# Patient Record
Sex: MALE | Race: BLACK OR AFRICAN AMERICAN | NOT HISPANIC OR LATINO | Employment: FULL TIME | URBAN - METROPOLITAN AREA
[De-identification: names, ages, dates, MRNs, and addresses within clinical notes are randomized per-mention and may not be internally consistent; named-entity substitution may affect disease eponyms.]

---

## 2023-05-08 ENCOUNTER — OFFICE VISIT (OUTPATIENT)
Dept: PRIMARY CARE | Facility: CLINIC | Age: 68
End: 2023-05-08
Payer: MEDICARE

## 2023-05-08 VITALS
HEIGHT: 70 IN | SYSTOLIC BLOOD PRESSURE: 156 MMHG | WEIGHT: 181 LBS | HEART RATE: 71 BPM | DIASTOLIC BLOOD PRESSURE: 78 MMHG | BODY MASS INDEX: 25.91 KG/M2 | OXYGEN SATURATION: 100 %

## 2023-05-08 DIAGNOSIS — E78.2 MIXED HYPERLIPIDEMIA: ICD-10-CM

## 2023-05-08 DIAGNOSIS — I10 PRIMARY HYPERTENSION: ICD-10-CM

## 2023-05-08 DIAGNOSIS — J01.91 ACUTE RECURRENT SINUSITIS, UNSPECIFIED LOCATION: Primary | ICD-10-CM

## 2023-05-08 DIAGNOSIS — R41.3 MEMORY LOSS: ICD-10-CM

## 2023-05-08 PROCEDURE — 1036F TOBACCO NON-USER: CPT

## 2023-05-08 PROCEDURE — 1159F MED LIST DOCD IN RCRD: CPT

## 2023-05-08 PROCEDURE — 3078F DIAST BP <80 MM HG: CPT

## 2023-05-08 PROCEDURE — 99214 OFFICE O/P EST MOD 30 MIN: CPT

## 2023-05-08 PROCEDURE — 3077F SYST BP >= 140 MM HG: CPT

## 2023-05-08 RX ORDER — ATORVASTATIN CALCIUM 80 MG/1
80 TABLET, FILM COATED ORAL DAILY
Qty: 90 TABLET | Refills: 1 | Status: SHIPPED | OUTPATIENT
Start: 2023-05-08 | End: 2023-12-06

## 2023-05-08 RX ORDER — AMOXICILLIN AND CLAVULANATE POTASSIUM 875; 125 MG/1; MG/1
875 TABLET, FILM COATED ORAL 2 TIMES DAILY
Qty: 20 TABLET | Refills: 0 | Status: SHIPPED | OUTPATIENT
Start: 2023-05-08 | End: 2023-05-18

## 2023-05-08 RX ORDER — NAPROXEN SODIUM 220 MG/1
81 TABLET, FILM COATED ORAL DAILY
Qty: 30 TABLET | Refills: 5 | Status: SHIPPED | OUTPATIENT
Start: 2023-05-08 | End: 2023-11-04

## 2023-05-08 RX ORDER — LISINOPRIL 20 MG/1
20 TABLET ORAL DAILY
Qty: 30 TABLET | Refills: 0 | Status: SHIPPED | OUTPATIENT
Start: 2023-05-08 | End: 2023-09-12

## 2023-05-08 NOTE — PROGRESS NOTES
"Subjective   Patient ID: Johnnie Yuan is a 68 y.o. male who presents for Hypertension (Pt stated he was taken off of HTN meds, but still has a few at home that he take occasionally. Pt doesn't take cholesterol meds due to forgetting to take it.) and Sinusitis (Stopped up for 3 weeks, chest pain).    HPI    Sinusitis  Patient states he has had issues with sinus congestion off and on for the past two years  Endorsing significant clear nasal discharge, and congestion for the past three weeks which is worse than his baseline   Denies any fever, otalgia or generalized malaise  Patient experiencing chest tightness secondary to cough , which started a couple of days ago.  Also endorsing shortness of breath with walking multiple flights of stairs secondary to congestion.  When patient lays on his side he can feel sinuses drain and open  Has been taking Afrin for symptoms which has not been helping  Has not tested for COVID    2.  Hypertension  History of hypertension,   Was managed on Lisinopril 20mg every day  Has not taken his medication in months, other than one pill this morning    4. Hyperlipidemia  Prescribed Atorvastatin 80mg every day and ASA 81 mg every day  Has not taken medication secondary to forgetting   History of CVA        Review of Systems  All pertinent positive symptoms are included in the history of present illness.    All other systems have been reviewed and are negative and noncontributory to this patient's current ailments.     No Known Allergies       There is no immunization history on file for this patient.    Objective   Vitals:    05/08/23 1502   BP: 156/78   Pulse: 71   SpO2: 100%   Weight: 82.1 kg (181 lb)   Height: 1.778 m (5' 10\")       Physical Exam  CONSTITUTIONAL - well nourished, well developed, looks like stated age, in no acute distress, not ill-appearing, and not tired appearing  SKIN - normal skin color and pigmentation, normal skin turgor without rash, lesions, or nodules " visualized  HEAD - no trauma, normocephalic  EYES - pupils are equal and reactive to light, extraocular muscles are intact, and normal external exam  ENT - TM's intact, no injection, no signs of infection, uvula midline, normal tongue movement and throat normal, no exudate, nasal turbinates swollen  NECK - supple without rigidity, no neck mass was observed, no thyromegaly or thyroid nodules  CHEST - clear to auscultation, no wheezing, no crackles and no rales, good effort  CARDIAC - regular rate and regular rhythm, no skipped beats, no murmur  EXTREMITIES - no edema, no deformities  NEUROLOGICAL - normal gait, normal balance, normal motor, no ataxia,  alert, oriented and no focal signs  PSYCHIATRIC - alert, pleasant and cordial, age-appropriate  IMMUNOLOGIC - no cervical lymphadenopathy         Assessment/Plan   Problem List Items Addressed This Visit          Circulatory    Primary hypertension     Please return to care in 3-4 weeks for blood pressure check         Relevant Medications    lisinopril 20 mg tablet       Infectious/Inflammatory    Acute recurrent sinusitis - Primary     Will consider CXR if symptoms do not improve with antibiotics prescribed         Relevant Medications    amoxicillin-pot clavulanate (Augmentin) 875-125 mg tablet    Other Relevant Orders    Referral to ENT       Other    Mixed hyperlipidemia    Relevant Medications    aspirin 81 mg chewable tablet    atorvastatin (Lipitor) 80 mg tablet    Memory loss     Please return to care for MOCA screening at later time

## 2023-05-22 ENCOUNTER — OFFICE VISIT (OUTPATIENT)
Dept: PRIMARY CARE | Facility: CLINIC | Age: 68
End: 2023-05-22
Payer: MEDICARE

## 2023-05-22 VITALS
TEMPERATURE: 97.3 F | SYSTOLIC BLOOD PRESSURE: 116 MMHG | DIASTOLIC BLOOD PRESSURE: 64 MMHG | OXYGEN SATURATION: 98 % | BODY MASS INDEX: 26.32 KG/M2 | HEART RATE: 85 BPM | WEIGHT: 183.4 LBS

## 2023-05-22 DIAGNOSIS — J01.01 ACUTE RECURRENT MAXILLARY SINUSITIS: Primary | ICD-10-CM

## 2023-05-22 PROCEDURE — 99213 OFFICE O/P EST LOW 20 MIN: CPT

## 2023-05-22 PROCEDURE — 1036F TOBACCO NON-USER: CPT

## 2023-05-22 PROCEDURE — 3074F SYST BP LT 130 MM HG: CPT

## 2023-05-22 PROCEDURE — 3078F DIAST BP <80 MM HG: CPT

## 2023-05-22 PROCEDURE — 1159F MED LIST DOCD IN RCRD: CPT

## 2023-05-22 NOTE — ASSESSMENT & PLAN NOTE
ENT referral placed last visit  Please follow up with ENT for further work up following minimal response to antibiotics  Phone number to ENT scheduling given to patient directly  Please follow after you visit ENT

## 2023-05-22 NOTE — PROGRESS NOTES
Subjective   Patient ID: Johnnie Yuan is a 68 y.o. male who presents for Sinusitis (FUV/Stuffy; antibiotics didn't work. ).    HPI  Sinus congestion  Endorsing sinus congestion for the past four weeks  History of recurrent sinusitis, that comes and goes  Was prescribed Augmentin 05/10, states it did not improve improve symptoms   Denies any chest pain or shortness of breath  Last visit states that he felt chest congestion, this has since resolved and is isolated to maxillary sinuses  Denies any fevers       Review of Systems  All pertinent positive symptoms are included in the history of present illness.    All other systems have been reviewed and are negative and noncontributory to this patient's current ailments.     No Known Allergies       There is no immunization history on file for this patient.    Objective   Vitals:    05/22/23 0958   BP: 116/64   Pulse: 85   Temp: 36.3 °C (97.3 °F)   SpO2: 98%   Weight: 83.2 kg (183 lb 6.4 oz)       Physical Exam  CONSTITUTIONAL - well nourished, well developed, looks like stated age, in no acute distress, not ill-appearing, and not tired appearing  SKIN - normal skin color and pigmentation, normal skin turgor without rash, lesions, or nodules visualized  HEAD - no trauma, normocephalic  EYES - pupils are equal and reactive to light, extraocular muscles are intact, and normal external exam  ENT - TM's intact, no injection, no signs of infection, uvula midline, tongue movement, cobblestoning notable in the throat, no exudate, nasal passage with swollen nasal turbinates  NECK - supple without rigidity, no neck mass was observed, no thyromegaly or thyroid nodules  CHEST - clear to auscultation, no wheezing, no crackles and no rales, good effort  CARDIAC - regular rate and regular rhythm, no skipped beats, no murmur  EXTREMITIES - no edema, no deformities  NEUROLOGICAL - normal gait, normal balance, normal motor, no ataxia, alert, oriented and no focal signs  PSYCHIATRIC -  alert, pleasant and cordial, age-appropriate  IMMUNOLOGIC - no cervical lymphadenopathy         Assessment/Plan   Problem List Items Addressed This Visit          Infectious/Inflammatory    Acute recurrent sinusitis - Primary     ENT referral placed last visit  Please follow up with ENT for further work up following minimal response to antibiotics  Phone number to ENT scheduling given to patient directly  Please follow after you visit ENT

## 2023-08-22 ENCOUNTER — OFFICE VISIT (OUTPATIENT)
Dept: PRIMARY CARE | Facility: CLINIC | Age: 68
End: 2023-08-22
Payer: MEDICARE

## 2023-08-22 VITALS
OXYGEN SATURATION: 99 % | TEMPERATURE: 97.6 F | WEIGHT: 182 LBS | DIASTOLIC BLOOD PRESSURE: 74 MMHG | HEART RATE: 74 BPM | BODY MASS INDEX: 27.58 KG/M2 | HEIGHT: 68 IN | SYSTOLIC BLOOD PRESSURE: 134 MMHG

## 2023-08-22 DIAGNOSIS — D57.3 SICKLE-CELL TRAIT (CMS-HCC): ICD-10-CM

## 2023-08-22 DIAGNOSIS — Z87.891 FORMER CIGARETTE SMOKER: ICD-10-CM

## 2023-08-22 DIAGNOSIS — D72.819 LEUKOPENIA, UNSPECIFIED TYPE: Primary | ICD-10-CM

## 2023-08-22 DIAGNOSIS — R41.3 MEMORY LOSS: ICD-10-CM

## 2023-08-22 DIAGNOSIS — E55.9 VITAMIN D DEFICIENCY: ICD-10-CM

## 2023-08-22 DIAGNOSIS — I10 PRIMARY HYPERTENSION: ICD-10-CM

## 2023-08-22 DIAGNOSIS — J84.10 POSTINFLAMMATORY PULMONARY FIBROSIS (MULTI): ICD-10-CM

## 2023-08-22 DIAGNOSIS — E78.2 MIXED HYPERLIPIDEMIA: ICD-10-CM

## 2023-08-22 DIAGNOSIS — N52.9 ERECTILE DYSFUNCTION, UNSPECIFIED ERECTILE DYSFUNCTION TYPE: ICD-10-CM

## 2023-08-22 PROBLEM — H52.4 HYPEROPIA OF BOTH EYES WITH ASTIGMATISM AND PRESBYOPIA: Status: ACTIVE | Noted: 2023-08-22

## 2023-08-22 PROBLEM — R79.89 LOW VITAMIN D LEVEL: Status: ACTIVE | Noted: 2023-08-22

## 2023-08-22 PROBLEM — J34.3 HYPERTROPHY OF BOTH INFERIOR NASAL TURBINATES: Status: ACTIVE | Noted: 2023-08-22

## 2023-08-22 PROBLEM — H04.123 DRY EYE SYNDROME OF BOTH LACRIMAL GLANDS: Status: ACTIVE | Noted: 2023-08-22

## 2023-08-22 PROBLEM — Z86.19 HISTORY OF SHINGLES: Status: ACTIVE | Noted: 2023-08-22

## 2023-08-22 PROBLEM — J32.0 CHRONIC MAXILLARY SINUSITIS: Status: ACTIVE | Noted: 2023-08-22

## 2023-08-22 PROBLEM — Z86.73 HISTORY OF STROKE: Status: ACTIVE | Noted: 2023-08-22

## 2023-08-22 PROBLEM — H52.203 HYPEROPIA OF BOTH EYES WITH ASTIGMATISM AND PRESBYOPIA: Status: ACTIVE | Noted: 2023-08-22

## 2023-08-22 PROBLEM — H90.3 SENSORINEURAL HEARING LOSS, BILATERAL: Status: ACTIVE | Noted: 2023-08-22

## 2023-08-22 PROBLEM — J32.2 CHRONIC ETHMOIDAL SINUSITIS: Status: ACTIVE | Noted: 2023-08-22

## 2023-08-22 PROBLEM — H93.13 TINNITUS, SUBJECTIVE, BILATERAL: Status: ACTIVE | Noted: 2023-08-22

## 2023-08-22 PROBLEM — H25.13 CATARACT, NUCLEAR SCLEROTIC, BOTH EYES: Status: ACTIVE | Noted: 2023-08-22

## 2023-08-22 PROBLEM — H52.03 HYPEROPIA OF BOTH EYES WITH ASTIGMATISM AND PRESBYOPIA: Status: ACTIVE | Noted: 2023-08-22

## 2023-08-22 PROBLEM — M25.561 BILATERAL KNEE PAIN: Status: ACTIVE | Noted: 2023-08-22

## 2023-08-22 PROBLEM — R91.1 PULMONARY NODULE: Status: ACTIVE | Noted: 2023-08-22

## 2023-08-22 PROBLEM — M11.89 PSEUDOGOUT INVOLVING MULTIPLE JOINTS: Status: ACTIVE | Noted: 2023-08-22

## 2023-08-22 PROBLEM — K40.90 INGUINAL HERNIA: Status: ACTIVE | Noted: 2023-08-22

## 2023-08-22 PROBLEM — M25.562 BILATERAL KNEE PAIN: Status: ACTIVE | Noted: 2023-08-22

## 2023-08-22 PROCEDURE — 3078F DIAST BP <80 MM HG: CPT | Performed by: NURSE PRACTITIONER

## 2023-08-22 PROCEDURE — 99214 OFFICE O/P EST MOD 30 MIN: CPT | Performed by: NURSE PRACTITIONER

## 2023-08-22 PROCEDURE — 1160F RVW MEDS BY RX/DR IN RCRD: CPT | Performed by: NURSE PRACTITIONER

## 2023-08-22 PROCEDURE — 1036F TOBACCO NON-USER: CPT | Performed by: NURSE PRACTITIONER

## 2023-08-22 PROCEDURE — 3075F SYST BP GE 130 - 139MM HG: CPT | Performed by: NURSE PRACTITIONER

## 2023-08-22 PROCEDURE — 1125F AMNT PAIN NOTED PAIN PRSNT: CPT | Performed by: NURSE PRACTITIONER

## 2023-08-22 PROCEDURE — 1159F MED LIST DOCD IN RCRD: CPT | Performed by: NURSE PRACTITIONER

## 2023-08-22 RX ORDER — TAMSULOSIN HYDROCHLORIDE 0.4 MG/1
0.4 CAPSULE ORAL DAILY
COMMUNITY
Start: 2023-05-30 | End: 2024-02-02

## 2023-08-22 ASSESSMENT — ENCOUNTER SYMPTOMS
DEPRESSION: 0
OCCASIONAL FEELINGS OF UNSTEADINESS: 0
LOSS OF SENSATION IN FEET: 0

## 2023-08-22 NOTE — PROGRESS NOTES
"Subjective   Patient ID: Johnnie Yuan is a 68 y.o. male who presents for Injections (Pt seeks all injections needs before fall. /He requests inj for RSV and flu./I stated we don't offer covid inj.) and Erectile Dysfunction (Pt would like to discuss options for meds. ).    HPI     Dyslipidemia/hypertension  Current meds: atorvastatin 80 mg at bedtime (admits he forgets to take this at times), lisinopril 20 mg QD, ASA 81 mg QD  Home blood pressure monitoring: once daily   Average: 120s-130s/70s  Salt intake: adds a little bit of salt, does not consume a lot of fast food, take out, frozen foods  Caffeine intake: none  Diet: unhealthy   Exercise: none, active   Alcohol use: weekends, about 2 beers total   Tobacco use: none  Illicit drug use: marijuana once per week      Denies vision changes, headaches, dizziness, chest pain, palpitations, or edema.     BPH  Adherent to tamsulosin. Wakes up around 3 am to void. Does not drink caffeine. Denies urinary urgency, hematuria, dysuria, urinary frequency, or incontinence. He is experiencing ED and is wanting medications. States he has difficulty getting an erection and maintaining.      Postinflammatory pulmonary fibrosis   Has not smoked since 2015. Smoked for 40+ years, about 1 ppd. Denies coughing, chest pain, or shortness of breath.     Sickle Cell Trait  Denies SCD. Denies any symptoms.     Has been noticing issues with his memory since he had a stroke in 2015 but worse within the last year. States he barely remembers anything from his 20 years in the . He reports trouble remembering why he got up to do something or forgetting where he places things. States he saw a neurologist last year but cannot remember her name. States he was told everything was normal.     Review of Systems  ROS negative except as noted above in HPI.       Objective   /74   Pulse 74   Temp 36.4 °C (97.6 °F)   Ht 1.727 m (5' 8\")   Wt 82.6 kg (182 lb)   SpO2 99%   BMI 27.67 kg/m² "     Physical Exam  General: Alert and oriented, in no acute distress. Appears stated age, well-nourished, and well hydrated  HEENT:  - Head: Normocephalic and atraumatic   - Eyes: EOMI, PERRLA  - ENT: Hearing grossly intact  Heart: RRR. No murmurs, clicks, or rubs  Lungs: Unlabored breathing. CTAB with no crackles, wheezes, or rhonchi  Abdomen: Normal BS in all 4 quadrants. Soft, non-tender, non-distended, with no masses  Extremities: Warm and well perfused. No edema. Normal peripheral pulses  Musculoskeletal: Normal gait and station  Neurological: Alert and oriented. No gross neurological deficits  Psychological: Appropriate mood and affect  Skin: No rash, abnormal lesions, cyanosis, or erythema      Assessment/Plan   Hypertension  - Well controlled  - Continue lisinopril 20 mg every day  - Continue to monitor BP at home  - Lifestyle management    Dyslipidemia  - Check lipid panel, CMP  - Continue atorvastatin 80 mg every day    BPH  - Continue tamsulosin 0.4 mg every day   - Recommend following up with urology as instructed    ED  - Difficulty getting and maintaining an erection  - Recommend following up with urology    Postinflammatory pulmonary fibrosis   - Denies coughing, chest pain, shortness of breath    Former smoker  - CT lung CA screening    Sickle cell trait  - Asymptomatic    Memory loss  - MOCA score: 21/30  - Neuropsychology referral    Leukopenia  - Check CBCd  - Will likely need to follow up with hematology    Vitamin D deficiency  - Check vit D     RTC in 3 months for Medicare Wellness Exam or sooner IFEOMA Solorio-CNP  [facility]

## 2023-08-23 ENCOUNTER — LAB (OUTPATIENT)
Dept: LAB | Facility: LAB | Age: 68
End: 2023-08-23
Payer: MEDICARE

## 2023-08-23 DIAGNOSIS — D72.819 LEUKOPENIA, UNSPECIFIED TYPE: ICD-10-CM

## 2023-08-23 DIAGNOSIS — E78.2 MIXED HYPERLIPIDEMIA: ICD-10-CM

## 2023-08-23 DIAGNOSIS — I10 PRIMARY HYPERTENSION: ICD-10-CM

## 2023-08-23 DIAGNOSIS — E55.9 VITAMIN D DEFICIENCY: ICD-10-CM

## 2023-08-23 LAB
ALANINE AMINOTRANSFERASE (SGPT) (U/L) IN SER/PLAS: 19 U/L (ref 10–52)
ALBUMIN (G/DL) IN SER/PLAS: 4.4 G/DL (ref 3.4–5)
ALKALINE PHOSPHATASE (U/L) IN SER/PLAS: 80 U/L (ref 33–136)
ANION GAP IN SER/PLAS: 8 MMOL/L (ref 10–20)
ASPARTATE AMINOTRANSFERASE (SGOT) (U/L) IN SER/PLAS: 16 U/L (ref 9–39)
BASOPHILS (10*3/UL) IN BLOOD BY AUTOMATED COUNT: 0.04 X10E9/L (ref 0–0.1)
BASOPHILS/100 LEUKOCYTES IN BLOOD BY AUTOMATED COUNT: 1.2 % (ref 0–2)
BILIRUBIN TOTAL (MG/DL) IN SER/PLAS: 0.7 MG/DL (ref 0–1.2)
CALCIDIOL (25 OH VITAMIN D3) (NG/ML) IN SER/PLAS: 37 NG/ML
CALCIUM (MG/DL) IN SER/PLAS: 9.6 MG/DL (ref 8.6–10.6)
CARBON DIOXIDE, TOTAL (MMOL/L) IN SER/PLAS: 31 MMOL/L (ref 21–32)
CHLORIDE (MMOL/L) IN SER/PLAS: 106 MMOL/L (ref 98–107)
CHOLESTEROL (MG/DL) IN SER/PLAS: 220 MG/DL (ref 0–199)
CHOLESTEROL IN HDL (MG/DL) IN SER/PLAS: 48.2 MG/DL
CHOLESTEROL/HDL RATIO: 4.6
CREATININE (MG/DL) IN SER/PLAS: 1.12 MG/DL (ref 0.5–1.3)
EOSINOPHILS (10*3/UL) IN BLOOD BY AUTOMATED COUNT: 0.11 X10E9/L (ref 0–0.7)
EOSINOPHILS/100 LEUKOCYTES IN BLOOD BY AUTOMATED COUNT: 3.3 % (ref 0–6)
ERYTHROCYTE DISTRIBUTION WIDTH (RATIO) BY AUTOMATED COUNT: 12.1 % (ref 11.5–14.5)
ERYTHROCYTE MEAN CORPUSCULAR HEMOGLOBIN CONCENTRATION (G/DL) BY AUTOMATED: 32.8 G/DL (ref 32–36)
ERYTHROCYTE MEAN CORPUSCULAR VOLUME (FL) BY AUTOMATED COUNT: 96 FL (ref 80–100)
ERYTHROCYTES (10*6/UL) IN BLOOD BY AUTOMATED COUNT: 4.57 X10E12/L (ref 4.5–5.9)
GFR MALE: 71 ML/MIN/1.73M2
GLUCOSE (MG/DL) IN SER/PLAS: 98 MG/DL (ref 74–99)
HEMATOCRIT (%) IN BLOOD BY AUTOMATED COUNT: 43.9 % (ref 41–52)
HEMOGLOBIN (G/DL) IN BLOOD: 14.4 G/DL (ref 13.5–17.5)
IMMATURE GRANULOCYTES/100 LEUKOCYTES IN BLOOD BY AUTOMATED COUNT: 0.3 % (ref 0–0.9)
LDL: 154 MG/DL (ref 0–99)
LEUKOCYTES (10*3/UL) IN BLOOD BY AUTOMATED COUNT: 3.4 X10E9/L (ref 4.4–11.3)
LYMPHOCYTES (10*3/UL) IN BLOOD BY AUTOMATED COUNT: 1.65 X10E9/L (ref 1.2–4.8)
LYMPHOCYTES/100 LEUKOCYTES IN BLOOD BY AUTOMATED COUNT: 48.8 % (ref 13–44)
MONOCYTES (10*3/UL) IN BLOOD BY AUTOMATED COUNT: 0.37 X10E9/L (ref 0.1–1)
MONOCYTES/100 LEUKOCYTES IN BLOOD BY AUTOMATED COUNT: 10.9 % (ref 2–10)
NEUTROPHILS (10*3/UL) IN BLOOD BY AUTOMATED COUNT: 1.2 X10E9/L (ref 1.2–7.7)
NEUTROPHILS/100 LEUKOCYTES IN BLOOD BY AUTOMATED COUNT: 35.5 % (ref 40–80)
NRBC (PER 100 WBCS) BY AUTOMATED COUNT: 0 /100 WBC (ref 0–0)
PLATELETS (10*3/UL) IN BLOOD AUTOMATED COUNT: 240 X10E9/L (ref 150–450)
POTASSIUM (MMOL/L) IN SER/PLAS: 4.4 MMOL/L (ref 3.5–5.3)
PROTEIN TOTAL: 7.3 G/DL (ref 6.4–8.2)
SODIUM (MMOL/L) IN SER/PLAS: 141 MMOL/L (ref 136–145)
TRIGLYCERIDE (MG/DL) IN SER/PLAS: 91 MG/DL (ref 0–149)
UREA NITROGEN (MG/DL) IN SER/PLAS: 11 MG/DL (ref 6–23)
VLDL: 18 MG/DL (ref 0–40)

## 2023-08-23 PROCEDURE — 85025 COMPLETE CBC W/AUTO DIFF WBC: CPT

## 2023-08-23 PROCEDURE — 82306 VITAMIN D 25 HYDROXY: CPT

## 2023-08-23 PROCEDURE — 80061 LIPID PANEL: CPT

## 2023-08-23 PROCEDURE — 36415 COLL VENOUS BLD VENIPUNCTURE: CPT

## 2023-08-23 PROCEDURE — 80053 COMPREHEN METABOLIC PANEL: CPT

## 2023-08-24 ENCOUNTER — TELEPHONE (OUTPATIENT)
Dept: PRIMARY CARE | Facility: CLINIC | Age: 68
End: 2023-08-24
Payer: MEDICARE

## 2023-08-24 DIAGNOSIS — D72.819 LEUKOPENIA, UNSPECIFIED TYPE: Primary | ICD-10-CM

## 2023-08-24 NOTE — RESULT ENCOUNTER NOTE
Please inform patient that his white blood cell count was still low. Recommend that he follow up with hematology in December as recommended last year. Does not look like the hematologist he saw last year is with  anymore, so I placed a new order for hematology. He can call [phone] to schedule.     Cholesterol elevated. Remind him to take his atorvastatin every day.

## 2023-09-11 DIAGNOSIS — I10 PRIMARY HYPERTENSION: ICD-10-CM

## 2023-09-12 RX ORDER — LISINOPRIL 20 MG/1
20 TABLET ORAL DAILY
Qty: 90 TABLET | Refills: 1 | Status: SHIPPED | OUTPATIENT
Start: 2023-09-12 | End: 2024-05-10

## 2023-11-10 PROBLEM — L91.8 OTHER HYPERTROPHIC DISORDERS OF THE SKIN: Status: ACTIVE | Noted: 2022-02-24

## 2023-11-10 PROBLEM — M17.12 ARTHRITIS OF KNEE, LEFT: Status: ACTIVE | Noted: 2023-11-10

## 2023-11-10 PROBLEM — L82.0 INFLAMED SEBORRHEIC KERATOSIS: Status: ACTIVE | Noted: 2022-02-24

## 2023-11-10 PROBLEM — L81.0 POSTINFLAMMATORY HYPERPIGMENTATION: Status: ACTIVE | Noted: 2022-02-24

## 2023-11-10 PROBLEM — M25.562 LEFT KNEE PAIN: Status: ACTIVE | Noted: 2023-11-10

## 2023-11-10 PROBLEM — D23.9 OTHER BENIGN NEOPLASM OF SKIN, UNSPECIFIED: Status: ACTIVE | Noted: 2022-02-24

## 2023-11-28 ENCOUNTER — APPOINTMENT (OUTPATIENT)
Dept: PRIMARY CARE | Facility: CLINIC | Age: 68
End: 2023-11-28
Payer: MEDICARE

## 2023-12-06 DIAGNOSIS — E78.2 MIXED HYPERLIPIDEMIA: ICD-10-CM

## 2023-12-06 RX ORDER — ATORVASTATIN CALCIUM 80 MG/1
80 TABLET, FILM COATED ORAL DAILY
Qty: 90 TABLET | Refills: 0 | Status: SHIPPED | OUTPATIENT
Start: 2023-12-06 | End: 2024-03-07 | Stop reason: SINTOL

## 2023-12-19 ENCOUNTER — OFFICE VISIT (OUTPATIENT)
Dept: PSYCHOLOGY | Facility: CLINIC | Age: 68
End: 2023-12-19
Payer: MEDICARE

## 2023-12-19 DIAGNOSIS — R41.3 MEMORY DEFICITS: Primary | ICD-10-CM

## 2023-12-19 DIAGNOSIS — R41.89 COGNITIVE DEFICITS: ICD-10-CM

## 2023-12-19 DIAGNOSIS — G31.84 MCI (MILD COGNITIVE IMPAIRMENT): ICD-10-CM

## 2023-12-19 PROCEDURE — 96116 NUBHVL XM PHYS/QHP 1ST HR: CPT | Mod: AH | Performed by: CLINICAL NEUROPSYCHOLOGIST

## 2023-12-19 PROCEDURE — 96133 NRPSYC TST EVAL PHYS/QHP EA: CPT | Mod: AH | Performed by: CLINICAL NEUROPSYCHOLOGIST

## 2023-12-19 PROCEDURE — 96133 NRPSYC TST EVAL PHYS/QHP EA: CPT | Performed by: CLINICAL NEUROPSYCHOLOGIST

## 2023-12-19 PROCEDURE — 96138 PSYCL/NRPSYC TECH 1ST: CPT | Mod: AH | Performed by: CLINICAL NEUROPSYCHOLOGIST

## 2023-12-19 PROCEDURE — 96132 NRPSYC TST EVAL PHYS/QHP 1ST: CPT | Performed by: CLINICAL NEUROPSYCHOLOGIST

## 2023-12-19 PROCEDURE — 1036F TOBACCO NON-USER: CPT | Performed by: CLINICAL NEUROPSYCHOLOGIST

## 2023-12-19 PROCEDURE — 96132 NRPSYC TST EVAL PHYS/QHP 1ST: CPT | Mod: AH | Performed by: CLINICAL NEUROPSYCHOLOGIST

## 2023-12-19 PROCEDURE — 1160F RVW MEDS BY RX/DR IN RCRD: CPT | Performed by: CLINICAL NEUROPSYCHOLOGIST

## 2023-12-19 PROCEDURE — 96139 PSYCL/NRPSYC TST TECH EA: CPT | Mod: AH | Performed by: CLINICAL NEUROPSYCHOLOGIST

## 2023-12-19 PROCEDURE — 96138 PSYCL/NRPSYC TECH 1ST: CPT | Performed by: CLINICAL NEUROPSYCHOLOGIST

## 2023-12-19 PROCEDURE — 96116 NUBHVL XM PHYS/QHP 1ST HR: CPT | Performed by: CLINICAL NEUROPSYCHOLOGIST

## 2023-12-19 PROCEDURE — 96139 PSYCL/NRPSYC TST TECH EA: CPT | Performed by: CLINICAL NEUROPSYCHOLOGIST

## 2023-12-19 PROCEDURE — 1125F AMNT PAIN NOTED PAIN PRSNT: CPT | Performed by: CLINICAL NEUROPSYCHOLOGIST

## 2023-12-19 PROCEDURE — 1159F MED LIST DOCD IN RCRD: CPT | Performed by: CLINICAL NEUROPSYCHOLOGIST
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
## 2024-01-03 ENCOUNTER — APPOINTMENT (OUTPATIENT)
Dept: PRIMARY CARE | Facility: CLINIC | Age: 69
End: 2024-01-03
Payer: OTHER GOVERNMENT

## 2024-01-09 ENCOUNTER — DOCUMENTATION (OUTPATIENT)
Dept: HEMATOLOGY/ONCOLOGY | Facility: HOSPITAL | Age: 69
End: 2024-01-09
Payer: MEDICARE

## 2024-01-09 NOTE — PROGRESS NOTES
Diagnosis leukopenia(2014 neutropenia, lymphocytopenia). History includes-leukopenia, mild cognitive impairment, memory loss, prior stroke (2015), anxiety, HTN, HLD, BPH, sickle cell trait, post inflammatory pulmonary fibrosis, recurrent sinusitis, Vitamin D deficiency.  Former patient of Dr. Del Cid, last visit 1/22/22. Previously saw Dr. Bingham.  Last labs 8/23/23 WBC 3.4, RBC 4.57, HGB 14.4, Hematocrit 43.9,   Patient was left a message with details for appointment scheduled on 1/18/24. Call [phone] to cancel or reschedule this appointment.

## 2024-01-16 ENCOUNTER — LAB (OUTPATIENT)
Dept: LAB | Facility: CLINIC | Age: 69
End: 2024-01-16
Payer: MEDICARE

## 2024-01-16 DIAGNOSIS — D72.819 LEUKOPENIA, UNSPECIFIED TYPE: ICD-10-CM

## 2024-01-16 DIAGNOSIS — D72.819 LEUKOPENIA, UNSPECIFIED TYPE: Primary | ICD-10-CM

## 2024-01-16 LAB
BASOPHILS # BLD AUTO: 0.02 X10*3/UL (ref 0–0.1)
BASOPHILS NFR BLD AUTO: 0.6 %
CRP SERPL-MCNC: <0.1 MG/DL
EOSINOPHIL # BLD AUTO: 0.1 X10*3/UL (ref 0–0.7)
EOSINOPHIL NFR BLD AUTO: 2.8 %
ERYTHROCYTE [DISTWIDTH] IN BLOOD BY AUTOMATED COUNT: 11.7 % (ref 11.5–14.5)
ERYTHROCYTE [SEDIMENTATION RATE] IN BLOOD BY WESTERGREN METHOD: 7 MM/H (ref 0–20)
HCT VFR BLD AUTO: 40.7 % (ref 41–52)
HGB BLD-MCNC: 14 G/DL (ref 13.5–17.5)
IMM GRANULOCYTES # BLD AUTO: 0.01 X10*3/UL (ref 0–0.7)
IMM GRANULOCYTES NFR BLD AUTO: 0.3 % (ref 0–0.9)
LYMPHOCYTES # BLD AUTO: 1.42 X10*3/UL (ref 1.2–4.8)
LYMPHOCYTES NFR BLD AUTO: 39.8 %
MCH RBC QN AUTO: 31.7 PG (ref 26–34)
MCHC RBC AUTO-ENTMCNC: 34.4 G/DL (ref 32–36)
MCV RBC AUTO: 92 FL (ref 80–100)
MONOCYTES # BLD AUTO: 0.33 X10*3/UL (ref 0.1–1)
MONOCYTES NFR BLD AUTO: 9.2 %
NEUTROPHILS # BLD AUTO: 1.69 X10*3/UL (ref 1.2–7.7)
NEUTROPHILS NFR BLD AUTO: 47.3 %
NRBC BLD-RTO: ABNORMAL /100{WBCS}
PLATELET # BLD AUTO: 234 X10*3/UL (ref 150–450)
RBC # BLD AUTO: 4.42 X10*6/UL (ref 4.5–5.9)
RHEUMATOID FACT SER NEPH-ACNC: <10 IU/ML (ref 0–15)
WBC # BLD AUTO: 3.6 X10*3/UL (ref 4.4–11.3)

## 2024-01-16 PROCEDURE — 86140 C-REACTIVE PROTEIN: CPT | Performed by: PHYSICIAN ASSISTANT

## 2024-01-16 PROCEDURE — 85652 RBC SED RATE AUTOMATED: CPT | Performed by: PHYSICIAN ASSISTANT

## 2024-01-16 PROCEDURE — 88189 FLOWCYTOMETRY/READ 16 & >: CPT | Performed by: PHYSICIAN ASSISTANT

## 2024-01-16 PROCEDURE — 86431 RHEUMATOID FACTOR QUANT: CPT | Performed by: PHYSICIAN ASSISTANT

## 2024-01-16 PROCEDURE — 85025 COMPLETE CBC W/AUTO DIFF WBC: CPT

## 2024-01-16 PROCEDURE — 36415 COLL VENOUS BLD VENIPUNCTURE: CPT

## 2024-01-16 PROCEDURE — 88185 FLOWCYTOMETRY/TC ADD-ON: CPT | Mod: TC | Performed by: PHYSICIAN ASSISTANT

## 2024-01-18 ENCOUNTER — OFFICE VISIT (OUTPATIENT)
Dept: HEMATOLOGY/ONCOLOGY | Facility: CLINIC | Age: 69
End: 2024-01-18
Payer: MEDICARE

## 2024-01-18 VITALS
RESPIRATION RATE: 18 BRPM | BODY MASS INDEX: 27.42 KG/M2 | DIASTOLIC BLOOD PRESSURE: 77 MMHG | WEIGHT: 180.34 LBS | SYSTOLIC BLOOD PRESSURE: 131 MMHG | TEMPERATURE: 98.6 F | OXYGEN SATURATION: 94 % | HEART RATE: 76 BPM

## 2024-01-18 DIAGNOSIS — D72.810 LYMPHOPENIA: Primary | ICD-10-CM

## 2024-01-18 PROCEDURE — 99215 OFFICE O/P EST HI 40 MIN: CPT | Performed by: PHYSICIAN ASSISTANT

## 2024-01-18 PROCEDURE — 1036F TOBACCO NON-USER: CPT | Performed by: PHYSICIAN ASSISTANT

## 2024-01-18 PROCEDURE — 99205 OFFICE O/P NEW HI 60 MIN: CPT | Performed by: PHYSICIAN ASSISTANT

## 2024-01-18 PROCEDURE — 1160F RVW MEDS BY RX/DR IN RCRD: CPT | Performed by: PHYSICIAN ASSISTANT

## 2024-01-18 PROCEDURE — 3075F SYST BP GE 130 - 139MM HG: CPT | Performed by: PHYSICIAN ASSISTANT

## 2024-01-18 PROCEDURE — 1159F MED LIST DOCD IN RCRD: CPT | Performed by: PHYSICIAN ASSISTANT

## 2024-01-18 PROCEDURE — 1126F AMNT PAIN NOTED NONE PRSNT: CPT | Performed by: PHYSICIAN ASSISTANT

## 2024-01-18 PROCEDURE — 3078F DIAST BP <80 MM HG: CPT | Performed by: PHYSICIAN ASSISTANT

## 2024-01-18 ASSESSMENT — ENCOUNTER SYMPTOMS
OCCASIONAL FEELINGS OF UNSTEADINESS: 0
DEPRESSION: 0
LOSS OF SENSATION IN FEET: 0

## 2024-01-18 ASSESSMENT — PATIENT HEALTH QUESTIONNAIRE - PHQ9
SUM OF ALL RESPONSES TO PHQ9 QUESTIONS 1 AND 2: 0
1. LITTLE INTEREST OR PLEASURE IN DOING THINGS: NOT AT ALL
2. FEELING DOWN, DEPRESSED OR HOPELESS: NOT AT ALL

## 2024-01-18 ASSESSMENT — COLUMBIA-SUICIDE SEVERITY RATING SCALE - C-SSRS
1. IN THE PAST MONTH, HAVE YOU WISHED YOU WERE DEAD OR WISHED YOU COULD GO TO SLEEP AND NOT WAKE UP?: NO
2. HAVE YOU ACTUALLY HAD ANY THOUGHTS OF KILLING YOURSELF?: NO
6. HAVE YOU EVER DONE ANYTHING, STARTED TO DO ANYTHING, OR PREPARED TO DO ANYTHING TO END YOUR LIFE?: NO

## 2024-01-18 ASSESSMENT — PAIN SCALES - GENERAL: PAINLEVEL: 0-NO PAIN

## 2024-01-18 NOTE — PROGRESS NOTES
Patient ID: Johnnie Yuan is a 68 y.o. male.  Referring Physician: No referring provider defined for this encounter.  Primary Care Provider: JASPAL Catalan  Visit Type: Follow Up    Location: [facility]  Diagnosis/Reason: Chronic Benign Leukopenia    Interval Hx  1/18/24  Johnnie Yuan is a 68 y.o. male following up today for transfer of care from Dr. Guicho Del Cid to R. Casal, DNP    Patient is primarily followed by my colleague R. Casal, DNP but is following up w/ me today as I am covering    NOTE:  Patient was previously followed by Dr. Del Cid in heme/onc and is transferring care to R. Casal, DNP (seeing RAMIN Jasmine PA-C who is covering). His last visit w/ Dr. Del Cid was 12/22/22 for leukopenia. At that time Dr. Del Cid attributed his neutropenia & lymphopenia to be idiopathic/chronic benign leukopenia and recommended annual follow-ups    Patient denies weight loss, abnormal bruising and bleeding, hematuria, blood in stool, dark/black stools, epistaxis, oral/gingival bleeding, lymphadenopathy, recurrent infections, recurrent fevers, night sweats, early satiety, abdominal pain, bone pain, chest pain, palpitations, SOB, MULLEN, fatigue, dizziness, lightheadedness, PICA.    Relevant Hx  12/22/22 - Last Visit w/ Dr. Del Cid  Patient had previously seen Dr. Bingham in 2018 for the same problem.  Work up included Hep B/C, HIV, arthritis panel, antiphospholipid antibodies, MGUS evaluation.  All of this was negative.  A US of liver and spleen was ordered but apparently not done  in our system.     The patient reports that he has been having more sinus problems recently but he does not have a history of recurrent infections.  Describes these more as allergies.  No recent hospitalizations.  He also denies fevers, night sweats, and weight loss. He  notes that he has been having issues with his leg where he feels as though his blood has thickened. He has not had any difficulty walking due to this issue, but  it does primarily affect him when he first wakes up in the morning. He adds that he has had  longstanding issues with his leg since being in the . He adds that he has been eating a normal diet and he does drink alcohol on the weekends, with about 6 beers and 2 alcoholic drinks.     Repeat labs sent 8/2022 with mild lymphocytopenia (0.96), mild neutropenia (1.44), mild anemia HGB 13.2.  Flow cytometry was normal.  CMP was normal, ferritin, folate were normal.  TSH, TMA was normal.  U    PMHx:  Active Ambulatory Problems     Diagnosis Date Noted    Acute recurrent sinusitis 05/08/2023    Mixed hyperlipidemia 05/08/2023    Primary hypertension 05/08/2023    Memory changes 05/08/2023    Bilateral knee pain 08/22/2023    Chronic ethmoidal sinusitis 08/22/2023    Cataract, nuclear sclerotic, both eyes 08/22/2023    Chronic maxillary sinusitis 08/22/2023    Dry eye syndrome of both lacrimal glands 08/22/2023    History of shingles 08/22/2023    History of stroke 08/22/2023    Hyperopia of both eyes with astigmatism and presbyopia 08/22/2023    Hypertrophy of both inferior nasal turbinates 08/22/2023    Inguinal hernia 08/22/2023    Leukocytopenia 08/22/2023    Low vitamin D level 08/22/2023    Pseudogout involving multiple joints 08/22/2023    Pulmonary nodule 08/22/2023    Sensorineural hearing loss, bilateral 08/22/2023    Tinnitus, subjective, bilateral 08/22/2023    Vitamin D deficiency 08/22/2023    Postinflammatory pulmonary fibrosis (CMS/HCC) 08/22/2023    Sickle-cell trait (CMS/HCC) 08/22/2023    Arthritis of knee, left 11/10/2023    Other benign neoplasm of skin, unspecified 02/24/2022    Other hypertrophic disorders of the skin 02/24/2022    Inflamed seborrheic keratosis 02/24/2022    Postinflammatory hyperpigmentation 02/24/2022    Left knee pain 11/10/2023     Resolved Ambulatory Problems     Diagnosis Date Noted    No Resolved Ambulatory Problems     Past Medical History:   Diagnosis Date    Acute  upper respiratory infection, unspecified 2019    Encounter for screening for malignant neoplasm of colon 2018    Other conditions influencing health status 2019    Other general symptoms and signs     Personal history of nicotine dependence 2018    Personal history of other diseases of the respiratory system 2019    Personal history of other diseases of the respiratory system     Personal history of other diseases of urinary system     Personal history of other specified conditions 2019    Personal history of other specified conditions 2019    Personal history of other specified conditions 2014    Personal history of transient ischemic attack (TIA), and cerebral infarction without residual deficits 2017      Stroke in   HTN  DLD    PSHx:  Past Surgical History:   Procedure Laterality Date    CT ANGIO NECK W  2014    CT NECK ANGIO W AND WO IV CONTRAST 2014 [facility] CLINICAL LEGACY    CT HEAD ANGIO W AND WO IV CONTRAST  2014    CT HEAD ANGIO W AND WO IV CONTRAST 2014 [facility] CLINICAL LEGACY    MR HEAD ANGIO WO IV CONTRAST  9/3/2014    MR HEAD ANGIO WO IV CONTRAST 9/3/2014 [facility] ANCILLARY LEGACY    MR NECK ANGIO WO IV CONTRAST  9/3/2014    MR NECK ANGIO WO IV CONTRAST 9/3/2014 [facility] ANCILLARY LEGACY    OTHER SURGICAL HISTORY  2017    Treatment Of The Left Leg    OTHER SURGICAL HISTORY  2019    Hernia repair      Knee surgery  Leg fracture    FHx:  Family History   Problem Relation Name Age of Onset    Other (end stage renal disease) Mother      Cancer Mother      Diabetes Father      Other (cardiac disorder) Other      Sickle cell trait Other        Mother: Pancreatic CA  Siblings: Brother -  w/ Sickle Cell Disease, Arrhythmia    Social Hx:  Johnnie Yuan    reports that he has never smoked. He has never used smokeless tobacco.  He  reports current alcohol use.  He  reports current drug use. Drug: Marijuana.  Social History      Socioeconomic History    Marital status:      Spouse name: Not on file    Number of children: Not on file    Years of education: Not on file    Highest education level: Not on file   Occupational History    Not on file   Tobacco Use    Smoking status: Never    Smokeless tobacco: Never   Substance and Sexual Activity    Alcohol use: Yes     Comment: beers on weekends    Drug use: Yes     Types: Marijuana    Sexual activity: Not on file   Other Topics Concern    Not on file   Social History Narrative    Not on file     Social Determinants of Health     Financial Resource Strain: Not on file   Food Insecurity: Not on file   Transportation Needs: Not on file   Physical Activity: Not on file   Stress: Not on file   Social Connections: Not on file   Intimate Partner Violence: Not on file   Housing Stability: Not on file      Living Situation: Lives at home  Occupation: Former  x 20 years  Alcohol Use: Heavy EtOH use on weekends (8 drinks on Saturdays & Sundays)  Smoking: Former smoker - Quit in 2015 - 20 pack year smoking hx  Recreational Drug Use: Denies  Special Diets: Reguar    Medications and allergies reviewed in EMR.    ROS:  Review of Systems - Oncology   10 point review of systems negative except as state in HPI.    Vitals & Statistics:  Objective   BSA: There is no height or weight on file to calculate BSA.  There were no vitals taken for this visit.    Physical Exam:  Physical Exam  Vitals and nursing note reviewed.   Constitutional:       Appearance: Normal appearance.   HENT:      Head: Normocephalic and atraumatic.      Right Ear: External ear normal.      Left Ear: External ear normal.      Nose: Nose normal.      Mouth/Throat:      Mouth: Mucous membranes are moist.      Pharynx: Oropharynx is clear.   Eyes:      General: Lids are normal.      Extraocular Movements: Extraocular movements intact.      Conjunctiva/sclera: Conjunctivae normal.      Pupils: Pupils are equal, round, and  reactive to light.   Cardiovascular:      Rate and Rhythm: Normal rate and regular rhythm.      Pulses: Normal pulses.      Heart sounds: Normal heart sounds.   Pulmonary:      Effort: Pulmonary effort is normal.      Breath sounds: Normal breath sounds.   Abdominal:      General: Abdomen is flat. Bowel sounds are normal.      Palpations: Abdomen is soft.      Comments: No masses or organomegaly upon physical exam   Musculoskeletal:         General: Normal range of motion.      Cervical back: Normal range of motion.   Lymphadenopathy:      Comments: No lymphadenopathy palpable on physical exam   Skin:     General: Skin is warm and dry.      Capillary Refill: Capillary refill takes less than 2 seconds.   Neurological:      General: No focal deficit present.      Mental Status: He is alert and oriented to person, place, and time.   Psychiatric:         Mood and Affect: Mood normal.         Behavior: Behavior normal.         Thought Content: Thought content normal.         Judgment: Judgment normal.           Results:  Lab Results   Component Value Date    WBC 3.6 (L) 01/16/2024    NEUTROABS 1.69 01/16/2024    IGABSOL 0.01 01/16/2024    LYMPHSABS 1.42 01/16/2024    MONOSABS 0.33 01/16/2024    EOSABS 0.10 01/16/2024    BASOSABS 0.02 01/16/2024    RBC 4.42 (L) 01/16/2024    MCV 92 01/16/2024    MCHC 34.4 01/16/2024    HGB 14.0 01/16/2024    HCT 40.7 (L) 01/16/2024     01/16/2024     Lab Results   Component Value Date    RETICCTPCT 1.7 12/17/2018      Lab Results   Component Value Date    CREATININE 1.12 08/23/2023    BUN 11 08/23/2023     08/23/2023    K 4.4 08/23/2023     08/23/2023    CO2 31 08/23/2023      Lab Results   Component Value Date    ALT 19 08/23/2023    AST 16 08/23/2023    ALKPHOS 80 08/23/2023    BILITOT 0.7 08/23/2023      Lab Results   Component Value Date    TSH 1.84 08/29/2022     Lab Results   Component Value Date    TSH 1.84 08/29/2022     Lab Results   Component Value Date     "FERRITIN 364 (H) 08/29/2022      Lab Results   Component Value Date    HLMUMIVQ00 531 10/20/2017      Lab Results   Component Value Date    FOLATE 11.1 08/29/2022     Lab Results   Component Value Date    SIMON NEGATIVE 12/17/2018    RF <10 01/16/2024    SEDRATE 7 01/16/2024      Lab Results   Component Value Date    CRP <0.10 01/16/2024      No results found for: \"MYKE\"  Lab Results   Component Value Date     08/29/2022     No results found for: \"HAPTOGLOBIN\"  Lab Results   Component Value Date    SPEP NORMAL 12/17/2018     Lab Results   Component Value Date    IGG 1,480 12/17/2018    IGM 34 (L) 12/17/2018     (H) 12/17/2018     Lab Results   Component Value Date    HEPBCAB NONREACTIVE 12/17/2018    HEPBSAG NONREACTIVE 12/17/2018    HEPCAB NON-REACTIVE 12/17/2018     Lab Results   Component Value Date    HIV1X2 NON REACTIVE 12/17/2018       Assessment:  Johnnie Yuan is a 68 y.o. male presenting today for transfer of care from Dr. Del Cid to R. Casal, DNP for chronic benign leukopenia    Patient is asymptomatic     Physical exam     I reviewed patient's chart including but not limited to labs, imaging, surgical/procedure notes, pathology, hospital notes, doctor's notes.    1/18/24 results:  Leukopenia at 3.6 - Differential WNL  MCV, MCHC, Hb, RDW all WNL - Hct & RBC modestly low  Platelets WNL    Plan:  Chronic Benign Leukopenia  Patient is stable and asymptomatic. No need for hematologic intervention at this time  RTC in 1  year  via in-person visit - F/U sooner if needed/urgent  W/ R. Casal, DNP w/ CBC-D, ESR, CRP up to 1 week prior - Orders entered    I had an extensive discussion with the patient regarding the diagnosis and discussed the plan of therapy, including general considerations regarding side effects and outcomes. Pt understood and gave appropriate teach back about the plan of care. All questions were answered to the patient's satisfaction. The patient is instructed to contact us at any time if " questions or problems arise. Thank you for the opportunity to participate in the care of this very pleasant patient.    Total time = 80 minutes. 50% or more of this time was spent in counseling and/or coordination of care including reviewing medical history/radiology/labs, examining patient, formulating outlined plan with team, and discussing plan with patient/family.      Sergio Jasmine PA-C

## 2024-01-19 LAB
CELL COUNT (BLOOD): 3.6 X10*3/UL
CELL POPULATIONS: NORMAL
DIAGNOSIS: NORMAL
FLOW DIFFERENTIAL: NORMAL
FLOW TEST ORDERED: NORMAL
LAB TEST METHOD: NORMAL
NUMBER OF CELLS COLLECTED: NORMAL PER TUBE
PATH REPORT.TOTAL CANCER: NORMAL
SIGNATURE COMMENT: NORMAL
SPECIMEN VIABILITY: NORMAL

## 2024-02-02 DIAGNOSIS — N40.0 BENIGN PROSTATIC HYPERPLASIA, UNSPECIFIED WHETHER LOWER URINARY TRACT SYMPTOMS PRESENT: Primary | ICD-10-CM

## 2024-02-02 RX ORDER — TAMSULOSIN HYDROCHLORIDE 0.4 MG/1
0.4 CAPSULE ORAL DAILY
Qty: 90 CAPSULE | Refills: 0 | Status: SHIPPED | OUTPATIENT
Start: 2024-02-02 | End: 2024-05-10

## 2024-03-07 ENCOUNTER — OFFICE VISIT (OUTPATIENT)
Dept: PRIMARY CARE | Facility: CLINIC | Age: 69
End: 2024-03-07
Payer: MEDICARE

## 2024-03-07 VITALS
DIASTOLIC BLOOD PRESSURE: 87 MMHG | HEART RATE: 73 BPM | HEIGHT: 70 IN | WEIGHT: 185 LBS | SYSTOLIC BLOOD PRESSURE: 139 MMHG | BODY MASS INDEX: 26.48 KG/M2

## 2024-03-07 DIAGNOSIS — E78.2 MIXED HYPERLIPIDEMIA: ICD-10-CM

## 2024-03-07 DIAGNOSIS — R25.2 MUSCLE CRAMP: ICD-10-CM

## 2024-03-07 DIAGNOSIS — Z13.6 SCREENING FOR AAA (ABDOMINAL AORTIC ANEURYSM): ICD-10-CM

## 2024-03-07 DIAGNOSIS — N40.0 BENIGN PROSTATIC HYPERPLASIA, UNSPECIFIED WHETHER LOWER URINARY TRACT SYMPTOMS PRESENT: ICD-10-CM

## 2024-03-07 DIAGNOSIS — Z87.891 FORMER CIGARETTE SMOKER: ICD-10-CM

## 2024-03-07 DIAGNOSIS — D57.3 SICKLE-CELL TRAIT (CMS-HCC): ICD-10-CM

## 2024-03-07 DIAGNOSIS — I10 PRIMARY HYPERTENSION: ICD-10-CM

## 2024-03-07 DIAGNOSIS — S46.211S STRAIN OF RIGHT BICEPS, SEQUELA: ICD-10-CM

## 2024-03-07 DIAGNOSIS — H53.451 DECREASED PERIPHERAL VISION OF RIGHT EYE: ICD-10-CM

## 2024-03-07 DIAGNOSIS — Z00.00 ENCOUNTER FOR MEDICARE ANNUAL WELLNESS EXAM: Primary | ICD-10-CM

## 2024-03-07 DIAGNOSIS — J84.10 POSTINFLAMMATORY PULMONARY FIBROSIS (MULTI): ICD-10-CM

## 2024-03-07 DIAGNOSIS — M17.12 ARTHRITIS OF KNEE, LEFT: ICD-10-CM

## 2024-03-07 DIAGNOSIS — Z13.31 DEPRESSION SCREENING: ICD-10-CM

## 2024-03-07 PROBLEM — M25.539 PAIN IN WRIST: Status: ACTIVE | Noted: 2024-03-07

## 2024-03-07 PROBLEM — B02.9 HERPES ZOSTER: Status: ACTIVE | Noted: 2024-03-07

## 2024-03-07 PROBLEM — R43.8 IMPAIRED SENSE OF SMELL: Status: ACTIVE | Noted: 2024-03-07

## 2024-03-07 PROBLEM — R53.83 OTHER FATIGUE: Status: ACTIVE | Noted: 2024-03-07

## 2024-03-07 PROBLEM — R53.83 MALAISE AND FATIGUE: Status: ACTIVE | Noted: 2024-03-07

## 2024-03-07 PROBLEM — R53.81 MALAISE AND FATIGUE: Status: ACTIVE | Noted: 2024-03-07

## 2024-03-07 PROBLEM — J01.91 ACUTE RECURRENT SINUSITIS: Status: RESOLVED | Noted: 2023-05-08 | Resolved: 2024-03-07

## 2024-03-07 PROBLEM — E78.5 DYSLIPIDEMIA: Status: ACTIVE | Noted: 2023-05-08

## 2024-03-07 PROBLEM — L91.8 SKIN TAG: Status: ACTIVE | Noted: 2024-03-07

## 2024-03-07 PROBLEM — G82.20 PARAPARESIS (MULTI): Status: ACTIVE | Noted: 2024-03-07

## 2024-03-07 PROBLEM — H52.00 HYPEROPIA: Status: ACTIVE | Noted: 2023-08-22

## 2024-03-07 PROBLEM — N52.9 ERECTILE DYSFUNCTION: Status: ACTIVE | Noted: 2024-03-07

## 2024-03-07 PROCEDURE — 1159F MED LIST DOCD IN RCRD: CPT | Performed by: NURSE PRACTITIONER

## 2024-03-07 PROCEDURE — 3079F DIAST BP 80-89 MM HG: CPT | Performed by: NURSE PRACTITIONER

## 2024-03-07 PROCEDURE — 1126F AMNT PAIN NOTED NONE PRSNT: CPT | Performed by: NURSE PRACTITIONER

## 2024-03-07 PROCEDURE — 1160F RVW MEDS BY RX/DR IN RCRD: CPT | Performed by: NURSE PRACTITIONER

## 2024-03-07 PROCEDURE — 1036F TOBACCO NON-USER: CPT | Performed by: NURSE PRACTITIONER

## 2024-03-07 PROCEDURE — G0444 DEPRESSION SCREEN ANNUAL: HCPCS | Performed by: NURSE PRACTITIONER

## 2024-03-07 PROCEDURE — G0439 PPPS, SUBSEQ VISIT: HCPCS | Performed by: NURSE PRACTITIONER

## 2024-03-07 PROCEDURE — 99214 OFFICE O/P EST MOD 30 MIN: CPT | Performed by: NURSE PRACTITIONER

## 2024-03-07 PROCEDURE — 1170F FXNL STATUS ASSESSED: CPT | Performed by: NURSE PRACTITIONER

## 2024-03-07 PROCEDURE — 3075F SYST BP GE 130 - 139MM HG: CPT | Performed by: NURSE PRACTITIONER

## 2024-03-07 RX ORDER — PRAVASTATIN SODIUM 40 MG/1
40 TABLET ORAL DAILY
Qty: 100 TABLET | Refills: 3 | Status: SHIPPED | OUTPATIENT
Start: 2024-03-07 | End: 2025-04-11

## 2024-03-07 RX ORDER — ASPIRIN 81 MG/1
81 TABLET ORAL DAILY
COMMUNITY

## 2024-03-07 ASSESSMENT — ACTIVITIES OF DAILY LIVING (ADL)
BATHING: INDEPENDENT
DRESSING: INDEPENDENT
TAKING_MEDICATION: INDEPENDENT
MANAGING_FINANCES: INDEPENDENT
GROCERY_SHOPPING: INDEPENDENT
DOING_HOUSEWORK: INDEPENDENT

## 2024-03-07 ASSESSMENT — ENCOUNTER SYMPTOMS
OCCASIONAL FEELINGS OF UNSTEADINESS: 1
LOSS OF SENSATION IN FEET: 0
DEPRESSION: 0

## 2024-03-07 NOTE — PROGRESS NOTES
Subjective   Reason for Visit: Johnnie Yuan is an 69 y.o. male here for a Medicare Wellness visit and chronic care.     HPI    1. Dyslipidemia  2. Hypertension  Current meds: atorvastatin 80 mg at bedtime, lisinopril 20 mg QD, ASA 81 mg QD  Home blood pressure monitoring: once daily   Average: 120s-130s/70s  Salt intake: adds a little bit of salt, does not consume a lot of fast food, take out, frozen foods  Caffeine intake: none  Diet: unhealthy   Exercise: none, active   Alcohol use: weekends, about 2 beers total   Tobacco use: none  Illicit drug use: marijuana once per week      Denies vision changes, headaches, dizziness, chest pain, palpitations, or edema    3. BPH  Adherent to tamsulosin  Wakes up 1-2 times at night to void. Does not drink caffeine  Denies urinary urgency, hematuria, dysuria, urinary frequency, or incontinence    4. Postinflammatory pulmonary fibrosis  Has not smoked since 2015  Smoked for 40+ years, about 1 ppd  Denies coughing, chest pain, or shortness of breath.      5. Sickle-cell trait  Denies SCD  Denies any symptoms    6. Blurry vision  Went to renew his license last week and during eye exam he noticed that his right peripheral vision was blurry   Does not notice any blurry vision otherwise   Requesting referral to eye doctor    7. R leg pain  Reports pain to his right leg that occurred about 3 times last week  The pain starts in his right buttock and radiates to his calf  States it felt like a charley horse or a muscle cramp  Lasted less than a minute at a time  Has not experienced it this week  Mentions he just restarted atorvastatin last week after not taking for a few months  Has not taken it in the last couple days    8. R bicep soreness  Reports soreness to his right bicep that comes and goes every 6 months or so ever since he was in the  50+ years ago  He initially injured it when he was doing pull ups in the   Does not have the pain now, last had it about a week  "ago    9. Chronic L knee pain  Pain is secondary to OA and remote healed tibial plateau fracture  Pain still bothers him daily  He can't stand for longer than an hour due to the pain  He is has difficulty putting on his pants in a standing position  He also is unable to golf or dance which he   Has done PT in the past, 2015, but admits he didn't finish it   He doesn't take anything for the pain     Patient Care Team:  JASPAL Catalan as PCP - General  JASPAL Catalan as PCP - [facility] ACO Attributed Provider  Rosanne M Casal, APRN-CNP, DNP as Nurse Practitioner (Hematology and Oncology)  Sergio Jasmine PA-C as Physician Assistant (Hematology and Oncology)     Review of Systems  ROS negative except as noted above in HPI.       Objective   Vitals:  /87   Pulse 73   Ht 1.778 m (5' 10\")   Wt 83.9 kg (185 lb)   BMI 26.54 kg/m²       Physical Exam  General: Alert and oriented, in no acute distress. Appears stated age, well-nourished, and well hydrated  HEENT:  - Head: Normocephalic and atraumatic   - Eyes: EOMI, PERRLA  - ENT: Hearing grossly intact  Heart: RRR. No murmurs, clicks, or rubs  Lungs: Unlabored breathing. CTAB with no crackles, wheezes, or rhonchi  Abdomen: Normal BS in all 4 quadrants. Soft, non-tender, non-distended, with no masses  Extremities: Warm and well perfused. No edema. Normal peripheral pulses  Musculoskeletal: Limited ROM of L knee. Crepitus with extension and flexion of L knee. Normal gait and station  Neurological: Alert and oriented. No gross neurological deficits  Psychological: Appropriate mood and affect  Skin: No rash, abnormal lesions, cyanosis, or erythema      Assessment/Plan     1. Hypertension, goal <140/90  - Well controlled  - Continue lisinopril 20 mg every day   - Continue to monitor BP at home  - Lifestyle management    2. Dyslipidemia  - Has not been taking atorvastatin consistently, but did restart last week and developed muscle cramps  - Stop " atorvastatin completely, start pravastatin 40 mg every day     3. BPH  - Continue tamsulosin 0.4 mg every day   - Recommend following up with urology     4. Postinflammatory pulmonary fibrosis  - Denies coughing, chest pain, shortness of breath    5. Sickle cell trait  - Asymptomatic     6. Peripheral blurry vision  - Referral placed for ophthalmology    7. Muscle cramps  - Likely 2/2 atorvastatin, stopping and starting pravastatin    8. R bicep pain  - Intermittent since he was in the  50+ years ago  - Recommend ibuprofen or tylenol prn    9. Chronic L knee pain  - Declined PT at this time  - NSAIDs or tylenol prn     10. Medicare Wellness Exam  - Due for RSV, TDAP, and prevnar vaccines --> will need to get at pharmacy  - U/s AAA ordered  - Up to date on other screenings and vaccines  - Depression screening negative  - Maintain healthy lifestyle    RTC in 3 months for chronic care or sooner IFEOMA Solorio-CNP  [facility]

## 2024-03-21 ENCOUNTER — HOSPITAL ENCOUNTER (OUTPATIENT)
Dept: VASCULAR MEDICINE | Facility: HOSPITAL | Age: 69
Discharge: HOME | End: 2024-03-21
Payer: MEDICARE

## 2024-03-21 DIAGNOSIS — Z13.6 SCREENING FOR AAA (ABDOMINAL AORTIC ANEURYSM): ICD-10-CM

## 2024-03-21 DIAGNOSIS — Z87.891 FORMER CIGARETTE SMOKER: ICD-10-CM

## 2024-03-21 PROCEDURE — 76706 US ABDL AORTA SCREEN AAA: CPT | Performed by: SURGERY

## 2024-03-21 PROCEDURE — 76706 US ABDL AORTA SCREEN AAA: CPT

## 2024-04-05 ENCOUNTER — APPOINTMENT (OUTPATIENT)
Dept: PRIMARY CARE | Facility: CLINIC | Age: 69
End: 2024-04-05
Payer: MEDICARE

## 2024-05-10 DIAGNOSIS — I10 PRIMARY HYPERTENSION: ICD-10-CM

## 2024-05-10 DIAGNOSIS — N40.0 BENIGN PROSTATIC HYPERPLASIA, UNSPECIFIED WHETHER LOWER URINARY TRACT SYMPTOMS PRESENT: ICD-10-CM

## 2024-05-10 RX ORDER — LISINOPRIL 20 MG/1
20 TABLET ORAL DAILY
Qty: 90 TABLET | Refills: 3 | Status: SHIPPED | OUTPATIENT
Start: 2024-05-10

## 2024-05-10 RX ORDER — TAMSULOSIN HYDROCHLORIDE 0.4 MG/1
0.4 CAPSULE ORAL DAILY
Qty: 90 CAPSULE | Refills: 3 | Status: SHIPPED | OUTPATIENT
Start: 2024-05-10

## 2024-06-12 ENCOUNTER — APPOINTMENT (OUTPATIENT)
Dept: OPHTHALMOLOGY | Facility: CLINIC | Age: 69
End: 2024-06-12
Payer: MEDICARE

## 2024-06-12 DIAGNOSIS — Z83.511 FAMILY HISTORY OF GLAUCOMA: ICD-10-CM

## 2024-06-12 DIAGNOSIS — H52.02 HYPEROPIA OF LEFT EYE: ICD-10-CM

## 2024-06-12 DIAGNOSIS — H53.451 DECREASED PERIPHERAL VISION OF RIGHT EYE: ICD-10-CM

## 2024-06-12 DIAGNOSIS — H52.11 MYOPIA OF RIGHT EYE: ICD-10-CM

## 2024-06-12 DIAGNOSIS — H52.223 REGULAR ASTIGMATISM OF BOTH EYES: ICD-10-CM

## 2024-06-12 DIAGNOSIS — H25.13 CATARACT, NUCLEAR SCLEROTIC, BOTH EYES: ICD-10-CM

## 2024-06-12 DIAGNOSIS — H40.003 GLAUCOMA SUSPECT OF BOTH EYES: ICD-10-CM

## 2024-06-12 DIAGNOSIS — H53.8 BLURRED VISION: Primary | ICD-10-CM

## 2024-06-12 DIAGNOSIS — H52.4 PRESBYOPIA: ICD-10-CM

## 2024-06-12 DIAGNOSIS — H04.123 DRY EYES: ICD-10-CM

## 2024-06-12 PROCEDURE — 92015 DETERMINE REFRACTIVE STATE: CPT | Performed by: OPHTHALMOLOGY

## 2024-06-12 PROCEDURE — 92004 COMPRE OPH EXAM NEW PT 1/>: CPT | Performed by: OPHTHALMOLOGY

## 2024-06-12 PROCEDURE — 76514 ECHO EXAM OF EYE THICKNESS: CPT | Performed by: OPHTHALMOLOGY

## 2024-06-12 ASSESSMENT — REFRACTION_MANIFEST
OS_SPHERE: +0.25
OD_CYLINDER: -0.50
OD_SPHERE: -1.50
OS_SPHERE: +0.50
OS_AXIS: 090
OD_ADD: +2.50
OD_SPHERE: -1.50
METHOD_AUTOREFRACTION: 1
OS_CYLINDER: -1.00
OS_ADD: +2.50
OS_AXIS: 090
OS_CYLINDER: -1.00
OD_AXIS: 090
OD_CYLINDER: -1.00
OD_AXIS: 090

## 2024-06-12 ASSESSMENT — CUP TO DISC RATIO
OD_RATIO: .2
OS_RATIO: .2

## 2024-06-12 ASSESSMENT — VISUAL ACUITY
METHOD: SNELLEN - LINEAR
OS_SC: 20/20-1
OD_PH_SC: 20/25
OD_SC: 20/100

## 2024-06-12 ASSESSMENT — PACHYMETRY
OS_CT(UM): 552
OD_CT(UM): 535

## 2024-06-12 ASSESSMENT — TONOMETRY
OD_IOP_MMHG: 23
IOP_METHOD: GOLDMANN APPLANATION
OS_IOP_MMHG: 23

## 2024-06-12 ASSESSMENT — EXTERNAL EXAM - LEFT EYE: OS_EXAM: NORMAL

## 2024-06-12 ASSESSMENT — SLIT LAMP EXAM - LIDS
COMMENTS: GOOD POSITION
COMMENTS: GOOD POSITION

## 2024-06-12 ASSESSMENT — EXTERNAL EXAM - RIGHT EYE: OD_EXAM: NORMAL

## 2024-06-12 NOTE — PROGRESS NOTES
Assessment/Plan   Diagnoses and all orders for this visit:  Blurred vision  Improved with refraction    Decreased peripheral vision of right eye  -     Referral to Ophthalmology  -will get visual field (VF) at next visit    Family history of glaucoma  continue to monitor    Dry eyes  -Start artificial tears both eyes (OU) qid  -stress compliance    Cataract, nuclear sclerotic, both eyes  Not visually significant at the present time  continue to monitor    Glaucoma suspect of both eyes  Borderline intraocular pressure (IOP)'s, normal cup-to-disc ratio (C/D) ratios both eyes  continue to monitor    Myopia of right eye  Hyperopia of left eye  Regular astigmatism of both eyes  Presbyopia  Refractive error  -give Rx for new glasses    Return in3    month(s) for follow up or sooner if having any problems  VF , OCT at next visit

## 2024-07-17 ENCOUNTER — APPOINTMENT (OUTPATIENT)
Dept: PRIMARY CARE | Facility: CLINIC | Age: 69
End: 2024-07-17
Payer: MEDICARE

## 2024-07-18 ENCOUNTER — OFFICE VISIT (OUTPATIENT)
Dept: PRIMARY CARE | Facility: CLINIC | Age: 69
End: 2024-07-18
Payer: MEDICARE

## 2024-07-18 VITALS
HEART RATE: 72 BPM | HEIGHT: 71 IN | DIASTOLIC BLOOD PRESSURE: 73 MMHG | SYSTOLIC BLOOD PRESSURE: 128 MMHG | WEIGHT: 177.6 LBS | BODY MASS INDEX: 24.86 KG/M2

## 2024-07-18 DIAGNOSIS — Z76.89 ENCOUNTER TO ESTABLISH CARE: ICD-10-CM

## 2024-07-18 DIAGNOSIS — Z12.5 SCREENING FOR PROSTATE CANCER: ICD-10-CM

## 2024-07-18 DIAGNOSIS — I10 PRIMARY HYPERTENSION: ICD-10-CM

## 2024-07-18 DIAGNOSIS — E78.5 DYSLIPIDEMIA: ICD-10-CM

## 2024-07-18 DIAGNOSIS — N40.0 BENIGN PROSTATIC HYPERPLASIA, UNSPECIFIED WHETHER LOWER URINARY TRACT SYMPTOMS PRESENT: Primary | ICD-10-CM

## 2024-07-18 DIAGNOSIS — J84.10 POSTINFLAMMATORY PULMONARY FIBROSIS (MULTI): ICD-10-CM

## 2024-07-18 DIAGNOSIS — D57.3 SICKLE-CELL TRAIT (CMS-HCC): ICD-10-CM

## 2024-07-18 DIAGNOSIS — M17.12 ARTHRITIS OF KNEE, LEFT: ICD-10-CM

## 2024-07-18 PROBLEM — D23.9 OTHER BENIGN NEOPLASM OF SKIN, UNSPECIFIED: Status: RESOLVED | Noted: 2022-02-24 | Resolved: 2024-07-18

## 2024-07-18 LAB
ALBUMIN SERPL BCP-MCNC: 4.3 G/DL (ref 3.4–5)
ALP SERPL-CCNC: 72 U/L (ref 33–136)
ALT SERPL W P-5'-P-CCNC: 20 U/L (ref 10–52)
ANION GAP SERPL CALC-SCNC: 13 MMOL/L (ref 10–20)
AST SERPL W P-5'-P-CCNC: 21 U/L (ref 9–39)
BILIRUB SERPL-MCNC: 0.5 MG/DL (ref 0–1.2)
BUN SERPL-MCNC: 17 MG/DL (ref 6–23)
CALCIUM SERPL-MCNC: 9 MG/DL (ref 8.6–10.6)
CHLORIDE SERPL-SCNC: 108 MMOL/L (ref 98–107)
CHOLEST SERPL-MCNC: 213 MG/DL (ref 0–199)
CHOLESTEROL/HDL RATIO: 3.8
CO2 SERPL-SCNC: 24 MMOL/L (ref 21–32)
CREAT SERPL-MCNC: 1.14 MG/DL (ref 0.5–1.3)
EGFRCR SERPLBLD CKD-EPI 2021: 69 ML/MIN/1.73M*2
GLUCOSE SERPL-MCNC: 93 MG/DL (ref 74–99)
HDLC SERPL-MCNC: 56.1 MG/DL
LDLC SERPL CALC-MCNC: 144 MG/DL
NON HDL CHOLESTEROL: 157 MG/DL (ref 0–149)
POTASSIUM SERPL-SCNC: 4.2 MMOL/L (ref 3.5–5.3)
PROT SERPL-MCNC: 7 G/DL (ref 6.4–8.2)
PSA SERPL-MCNC: 3.16 NG/ML
SODIUM SERPL-SCNC: 141 MMOL/L (ref 136–145)
TRIGL SERPL-MCNC: 65 MG/DL (ref 0–149)
VLDL: 13 MG/DL (ref 0–40)

## 2024-07-18 PROCEDURE — 3078F DIAST BP <80 MM HG: CPT | Performed by: NURSE PRACTITIONER

## 2024-07-18 PROCEDURE — 36415 COLL VENOUS BLD VENIPUNCTURE: CPT

## 2024-07-18 PROCEDURE — 3008F BODY MASS INDEX DOCD: CPT | Performed by: NURSE PRACTITIONER

## 2024-07-18 PROCEDURE — 80053 COMPREHEN METABOLIC PANEL: CPT

## 2024-07-18 PROCEDURE — 1036F TOBACCO NON-USER: CPT | Performed by: NURSE PRACTITIONER

## 2024-07-18 PROCEDURE — 1160F RVW MEDS BY RX/DR IN RCRD: CPT | Performed by: NURSE PRACTITIONER

## 2024-07-18 PROCEDURE — 80061 LIPID PANEL: CPT

## 2024-07-18 PROCEDURE — 99214 OFFICE O/P EST MOD 30 MIN: CPT | Performed by: NURSE PRACTITIONER

## 2024-07-18 PROCEDURE — 1159F MED LIST DOCD IN RCRD: CPT | Performed by: NURSE PRACTITIONER

## 2024-07-18 PROCEDURE — 3074F SYST BP LT 130 MM HG: CPT | Performed by: NURSE PRACTITIONER

## 2024-07-18 PROCEDURE — 1124F ACP DISCUSS-NO DSCNMKR DOCD: CPT | Performed by: NURSE PRACTITIONER

## 2024-07-18 PROCEDURE — G0103 PSA SCREENING: HCPCS

## 2024-07-18 PROCEDURE — G2211 COMPLEX E/M VISIT ADD ON: HCPCS | Performed by: NURSE PRACTITIONER

## 2024-07-18 ASSESSMENT — ENCOUNTER SYMPTOMS
DEPRESSION: 0
OCCASIONAL FEELINGS OF UNSTEADINESS: 0
LOSS OF SENSATION IN FEET: 0

## 2024-07-18 NOTE — PROGRESS NOTES
"Subjective   Patient ID: Johnnie Yuan is a 69 y.o. male who presents for chronic care.     HPI        1. Dyslipidemia  2. Hypertension  Current meds: pravastatin 40 mg at bedtime, lisinopril 20 mg QD, ASA 81 mg QD  Home blood pressure monitoring: every other day  Average: 110s/70s  Salt intake: adds a little bit of salt, does not consume a lot of fast food, take out, frozen foods  Caffeine intake: none  Diet: good  Exercise: none, active   Alcohol use: weekends, about 2 beers total   Tobacco use: none  Illicit drug use: marijuana once per week      Denies vision changes, headaches, dizziness, chest pain, palpitations, or edema     3. BPH  Adherent to tamsulosin  Wakes up 3-4 times at night to void  Does not drink caffeine  Denies urinary urgency, hematuria, dysuria, urinary frequency, or incontinence  Has not seen urology since 2022     4. Postinflammatory pulmonary fibrosis  Has not smoked since 2015  Smoked for 40+ years, about 1 ppd  Denies coughing, chest pain, or shortness of breath.       5. Sickle-cell trait  Denies SCD  Denies any symptoms     6. Chronic L knee pain  Pain is secondary to OA and remote healed tibial plateau fracture  Pain still bothers him daily  He can't stand for longer than an hour due to the pain  He is has difficulty putting on his pants in a standing position  He also is unable to golf or dance which he   Has done PT in the past, 2015, but admits he didn't finish it   He doesn't take anything for the pain      Review of Systems  ROS negative except as noted above in HPI.     Objective   /73   Pulse 72   Ht 1.803 m (5' 11\")   Wt 80.6 kg (177 lb 9.6 oz)   BMI 24.77 kg/m²     Physical Exam  General: Alert and oriented, in no acute distress. Appears stated age, well-nourished, and well hydrated  HEENT:  - Head: Normocephalic and atraumatic   - Eyes: EOMI, PERRLA  - ENT: Hearing grossly intact  Heart: RRR. No murmurs, clicks, or rubs  Lungs: Unlabored breathing. CTAB with no " crackles, wheezes, or rhonchi  Abdomen: Normal BS in all 4 quadrants. Soft, non-tender, non-distended, with no masses  Extremities: Warm and well perfused. No edema. Normal peripheral pulses  Musculoskeletal: Normal gait and station  Neurological: Alert and oriented. No gross neurological deficits  Psychological: Appropriate mood and affect  Skin: No rash, abnormal lesions, cyanosis, or erythema    Assessment/Plan   1. Hypertension, goal <140/90  - Well controlled  - Continue lisinopril 20 mg every day   - Continue to monitor BP at home  - Lifestyle management     2. Dyslipidemia  - Continue pravastatin 40 mg every day, ASA 81 mg every day   - Check CMP and lipid panel     3. BPH  - Continue tamsulosin 0.4 mg every day   - Referral placed for urology      4. Postinflammatory pulmonary fibrosis  - Denies coughing, chest pain, shortness of breath     5. Sickle cell trait  - Asymptomatic      6.  Chronic L knee pain  - Declined PT at this time  - NSAIDs or tylenol prn     7. Screening for prostate CA  - Check PSA    Referral placed for primary care for patient to establish with a new provider as I am changing locations    IFEOMA Miller-CNP  [facility]

## 2024-07-19 DIAGNOSIS — E78.2 MIXED HYPERLIPIDEMIA: ICD-10-CM

## 2024-07-19 RX ORDER — PRAVASTATIN SODIUM 80 MG/1
80 TABLET ORAL DAILY
Qty: 100 TABLET | Refills: 3 | Status: SHIPPED | OUTPATIENT
Start: 2024-07-19 | End: 2025-08-23

## 2024-08-26 ENCOUNTER — LAB (OUTPATIENT)
Dept: LAB | Facility: LAB | Age: 69
End: 2024-08-26
Payer: MEDICARE

## 2024-08-26 ENCOUNTER — APPOINTMENT (OUTPATIENT)
Dept: UROLOGY | Facility: CLINIC | Age: 69
End: 2024-08-26
Payer: MEDICARE

## 2024-08-26 DIAGNOSIS — Z12.5 SCREENING FOR PROSTATE CANCER: ICD-10-CM

## 2024-08-26 DIAGNOSIS — N40.0 BENIGN PROSTATIC HYPERPLASIA, UNSPECIFIED WHETHER LOWER URINARY TRACT SYMPTOMS PRESENT: Primary | ICD-10-CM

## 2024-08-26 DIAGNOSIS — R31.21 ASYMPTOMATIC MICROSCOPIC HEMATURIA: ICD-10-CM

## 2024-08-26 LAB
POC APPEARANCE, URINE: CLEAR
POC BILIRUBIN, URINE: NEGATIVE
POC BLOOD, URINE: ABNORMAL
POC COLOR, URINE: ABNORMAL
POC GLUCOSE, URINE: NEGATIVE MG/DL
POC KETONES, URINE: NEGATIVE MG/DL
POC LEUKOCYTES, URINE: NEGATIVE
POC NITRITE,URINE: NEGATIVE
POC PH, URINE: 5.5 PH
POC PROTEIN, URINE: NEGATIVE MG/DL
POC SPECIFIC GRAVITY, URINE: 1.02
POC UROBILINOGEN, URINE: 0.2 EU/DL
PSA SERPL-MCNC: 2.99 NG/ML

## 2024-08-26 PROCEDURE — 81003 URINALYSIS AUTO W/O SCOPE: CPT | Performed by: NURSE PRACTITIONER

## 2024-08-26 PROCEDURE — G0103 PSA SCREENING: HCPCS

## 2024-08-26 PROCEDURE — 99213 OFFICE O/P EST LOW 20 MIN: CPT | Performed by: NURSE PRACTITIONER

## 2024-08-26 PROCEDURE — 1159F MED LIST DOCD IN RCRD: CPT | Performed by: NURSE PRACTITIONER

## 2024-08-26 PROCEDURE — 1160F RVW MEDS BY RX/DR IN RCRD: CPT | Performed by: NURSE PRACTITIONER

## 2024-08-26 PROCEDURE — 1036F TOBACCO NON-USER: CPT | Performed by: NURSE PRACTITIONER

## 2024-08-26 PROCEDURE — 36415 COLL VENOUS BLD VENIPUNCTURE: CPT

## 2024-08-26 PROCEDURE — 81001 URINALYSIS AUTO W/SCOPE: CPT

## 2024-08-26 PROCEDURE — 51798 US URINE CAPACITY MEASURE: CPT | Performed by: NURSE PRACTITIONER

## 2024-08-26 PROCEDURE — 87086 URINE CULTURE/COLONY COUNT: CPT

## 2024-08-26 NOTE — PROGRESS NOTES
"  [facility]  Outpatient Clinic Note    Subjective   Johnnie Yuan is a 69 y.o. male    History of Present Illness   Patient presenting to clinic today for FUV. History of BPH with LUTS, HLD, ED, Sickle cell trait.   Taking Tamsulosin 0.4 mg daily. He complains of NTF 3 to 4x. No DTF. He denies gross hematuria, dysuria, frequency, fevers, n/v, or flank pain.   Last visit with Dr. Atkins Dec 2022, Recommended PSA and Follow up with Cysto/TRUS. Patient was lost   To follow up.   Sexually active No   Urinalysis today Trace blood   No Prostate Cancer History     No results found for: \"PSA\"]    Past Medical History and Surgical History   Past Medical History:   Diagnosis Date    Acute upper respiratory infection, unspecified 08/19/2019    Viral URI with cough    Encounter for screening for malignant neoplasm of colon 05/21/2018    Colon cancer screening    Other conditions influencing health status 09/16/2019    History of cough    Other general symptoms and signs     Flu-like symptoms    Personal history of nicotine dependence 12/07/2018    History of tobacco abuse    Personal history of other diseases of the respiratory system 04/29/2019    History of nasal discharge    Personal history of other diseases of the respiratory system     History of sinus problem    Personal history of other diseases of urinary system     History of hematuria    Personal history of other specified conditions 07/08/2019    History of nasal congestion    Personal history of other specified conditions 09/23/2019    History of persistent cough    Personal history of other specified conditions 05/29/2014    History of urinary frequency    Personal history of transient ischemic attack (TIA), and cerebral infarction without residual deficits 09/05/2017    History of cerebral infarction     Past Surgical History:   Procedure Laterality Date    CT ANGIO NECK  9/4/2014    CT NECK ANGIO W AND WO IV CONTRAST 9/4/2014 [facility] CLINICAL LEGACY    CT " HEAD ANGIO W AND WO IV CONTRAST  9/4/2014    CT HEAD ANGIO W AND WO IV CONTRAST 9/4/2014 [facility] CLINICAL LEGACY    MR HEAD ANGIO WO IV CONTRAST  9/3/2014    MR HEAD ANGIO WO IV CONTRAST 9[place]/2014 [facility] [place] [place]    MR NECK ANGIO WO IV CONTRAST  9/3/2014    MR NECK ANGIO WO IV CONTRAST [place]3/2014 [facility] [place]    OTHER SURGICAL HISTORY  04/28/2017    Treatment Of The Left Leg    OTHER SURGICAL HISTORY  04/29/2019    Hernia repair       Medications  Current Outpatient Medications on File Prior to Visit   Medication Sig Dispense Refill    aspirin 81 mg EC tablet Take 1 tablet (81 mg) by mouth once daily.      lisinopril 20 mg tablet TAKE ONE TABLET BY MOUTH DAILY 90 tablet 3    pravastatin (Pravachol) 80 mg tablet Take 1 tablet (80 mg) by mouth once daily. 100 tablet 3    tamsulosin (Flomax) 0.4 mg 24 hr capsule TAKE ONE CAPSULE BY MOUTH EVERY DAY 90 capsule 3     No current facility-administered medications on file prior to visit.       Objective   Physicial Exam  General: Well developed, well nourished, alert and cooperative, appears in no acute distress  Eyes: Non-injected conjunctiva, sclera clear, no proptosis  Cardiac: Extremities are warm and well perfused. No edema, cyanosis or pallor.   Lungs: Breathing is easy, non-labored. Speaking in clear and complete sentences. Normal diaphragmatic movement.  MSK: Ambulatory with steady gait, unassisted  Neuro: alert and oriented to person, place and time  Psych: Demonstrates good judgement and reason, without hallucinations, abnormal affect or abnormal behaviors.  Skin: no obvious lesions, no rashes.    No visits with results within 1 Day(s) from this visit.   Latest known visit with results is:   Office Visit on 07/18/2024   Component Date Value Ref Range Status    Prostate Specific Antigen,Screen 07/18/2024 3.16  <=4.00 ng/mL Final    Glucose 07/18/2024 93  74 - 99 mg/dL Final    Sodium 07/18/2024 141  136 - 145 mmol/L Final    Potassium 07/18/2024 4.2  3.5 -  5.3 mmol/L Final    Chloride 07/18/2024 108 (H)  98 - 107 mmol/L Final    Bicarbonate 07/18/2024 24  21 - 32 mmol/L Final    Anion Gap 07/18/2024 13  10 - 20 mmol/L Final    Urea Nitrogen 07/18/2024 17  6 - 23 mg/dL Final    Creatinine 07/18/2024 1.14  0.50 - 1.30 mg/dL Final    eGFR 07/18/2024 69  >60 mL/min/1.73m*2 Final    Calculations of estimated GFR are performed using the 2021 CKD-EPI Study Refit equation without the race variable for the IDMS-Traceable creatinine methods.  https://jasn.asnjournals.org/content/early/2021/09/22/ASN.7514633806    Calcium 07/18/2024 9.0  8.6 - 10.6 mg/dL Final    Albumin 07/18/2024 4.3  3.4 - 5.0 g/dL Final    Alkaline Phosphatase 07/18/2024 72  33 - 136 U/L Final    Total Protein 07/18/2024 7.0  6.4 - 8.2 g/dL Final    AST 07/18/2024 21  9 - 39 U/L Final    Bilirubin, Total 07/18/2024 0.5  0.0 - 1.2 mg/dL Final    ALT 07/18/2024 20  10 - 52 U/L Final    Patients treated with Sulfasalazine may generate falsely decreased results for ALT.    Cholesterol 07/18/2024 213 (H)  0 - 199 mg/dL Final          Age      Desirable   Borderline High   High     0-19 Y     0 - 169       170 - 199     >/= 200    20-24 Y     0 - 189       190 - 224     >/= 225         >24 Y     0 - 199       200 - 239     >/= 240   **All ranges are based on fasting samples. Specific   therapeutic targets will vary based on patient-specific   cardiac risk.    Pediatric guidelines reference:Pediatrics 2011, 128(S5).Adult guidelines reference: NCEP ATPIII Guidelines,RAYNA 2001, 258:2486-97    Venipuncture immediately after or during the administration of Metamizole may lead to falsely low results. Testing should be performed immediately prior to Metamizole dosing.    HDL-Cholesterol 07/18/2024 56.1  mg/dL Final      Age       Very Low   Low     Normal    High    0-19 Y    < 35      < 40     40-45     ----  20-24 Y    ----     < 40      >45      ----        >24 Y      ----     < 40     40-60      >60       Cholesterol/HDL Ratio 07/18/2024 3.8   Final      Ref Values  Desirable  < 3.4  High Risk  > 5.0    LDL Calculated 07/18/2024 144 (H)  <=99 mg/dL Final                                Near   Borderline      AGE      Desirable  Optimal    High     High     Very High     0-19 Y     0 - 109     ---    110-129   >/= 130     ----    20-24 Y     0 - 119     ---    120-159   >/= 160     ----      >24 Y     0 -  99   100-129  130-159   160-189     >/=190      VLDL 07/18/2024 13  0 - 40 mg/dL Final    Triglycerides 07/18/2024 65  0 - 149 mg/dL Final       Age         Desirable   Borderline High   High     Very High   0 D-90 D    19 - 174         ----         ----        ----  91 D- 9 Y     0 -  74        75 -  99     >/= 100      ----    10-19 Y     0 -  89        90 - 129     >/= 130      ----    20-24 Y     0 - 114       115 - 149     >/= 150      ----         >24 Y     0 - 149       150 - 199    200- 499    >/= 500    Venipuncture immediately after or during the administration of Metamizole may lead to falsely low results. Testing should be performed immediately prior to Metamizole dosing.    Non HDL Cholesterol 07/18/2024 157 (H)  0 - 149 mg/dL Final          Age       Desirable   Borderline High   High     Very High     0-19 Y     0 - 119       120 - 144     >/= 145    >/= 160    20-24 Y     0 - 149       150 - 189     >/= 190      ----         >24 Y    30 mg/dL above LDL Cholesterol goal        Review of Systems  All other systems have been reviewed and are negative for complaint.      Assessment and Plan     - BPH with Nocturia     PVR 10 ml   Continue Tamsulosin 0.4 mg daily     Patient will obtain PSA and follow up with Dr. Atkins for cystoscopy and transrectal ultrasound in anticipation of an outlet procedure.     All questions and concerns were addressed. Patient verbalizes understanding and has no other questions at this time.     Lucila Hodges-- EARL DIA  Office Phone:  [phone]

## 2024-08-27 LAB
APPEARANCE UR: CLEAR
BACTERIA UR CULT: NO GROWTH
BILIRUB UR STRIP.AUTO-MCNC: NEGATIVE MG/DL
COLOR UR: ABNORMAL
GLUCOSE UR STRIP.AUTO-MCNC: NORMAL MG/DL
KETONES UR STRIP.AUTO-MCNC: NEGATIVE MG/DL
LEUKOCYTE ESTERASE UR QL STRIP.AUTO: NEGATIVE
MUCOUS THREADS #/AREA URNS AUTO: NORMAL /LPF
NITRITE UR QL STRIP.AUTO: NEGATIVE
PH UR STRIP.AUTO: 5.5 [PH]
PROT UR STRIP.AUTO-MCNC: NEGATIVE MG/DL
RBC # UR STRIP.AUTO: ABNORMAL /UL
RBC #/AREA URNS AUTO: NORMAL /HPF
SP GR UR STRIP.AUTO: 1.02
UROBILINOGEN UR STRIP.AUTO-MCNC: NORMAL MG/DL
WBC #/AREA URNS AUTO: NORMAL /HPF

## 2024-09-05 ENCOUNTER — APPOINTMENT (OUTPATIENT)
Dept: PRIMARY CARE | Facility: CLINIC | Age: 69
End: 2024-09-05
Payer: MEDICARE

## 2024-09-11 ENCOUNTER — APPOINTMENT (OUTPATIENT)
Dept: OPHTHALMOLOGY | Facility: CLINIC | Age: 69
End: 2024-09-11
Payer: MEDICARE

## 2024-09-25 ENCOUNTER — APPOINTMENT (OUTPATIENT)
Dept: UROLOGY | Facility: CLINIC | Age: 69
End: 2024-09-25
Payer: MEDICARE

## 2024-09-25 DIAGNOSIS — R39.12 BENIGN PROSTATIC HYPERPLASIA WITH WEAK URINARY STREAM: Primary | ICD-10-CM

## 2024-09-25 DIAGNOSIS — N40.1 BENIGN PROSTATIC HYPERPLASIA WITH WEAK URINARY STREAM: Primary | ICD-10-CM

## 2024-09-25 PROCEDURE — G2211 COMPLEX E/M VISIT ADD ON: HCPCS | Performed by: UROLOGY

## 2024-09-25 PROCEDURE — 99213 OFFICE O/P EST LOW 20 MIN: CPT | Performed by: UROLOGY

## 2024-09-25 NOTE — PROGRESS NOTES
Subjective     This visit was completed via telemedicine. All issues as below were discussed and addressed but no physical exam was performed unless allowed by visual confirmation. If it was felt that the patient should be evaluated in clinic, then they were directed there. Patient verbally consented to visit.      Johnnie Yuan is a 69 y.o. male with history of BPH with LUTS on Tamsulosin 0.4mg. Patient has complaints of worsening nocturia x3-4 and weak urinary stream despite being on Tamsulosin. He is interested in an outlet procedure. Denies any recent gross hematuria, fevers, chills, urinary retention, intractable flank or abdominal pain, nausea or vomiting.            Past Medical History:   Diagnosis Date    Acute upper respiratory infection, unspecified 08/19/2019    Viral URI with cough    Encounter for screening for malignant neoplasm of colon 05/21/2018    Colon cancer screening    Other conditions influencing health status 09/16/2019    History of cough    Other general symptoms and signs     Flu-like symptoms    Personal history of nicotine dependence 12/07/2018    History of tobacco abuse    Personal history of other diseases of the respiratory system 04/29/2019    History of nasal discharge    Personal history of other diseases of the respiratory system     History of sinus problem    Personal history of other diseases of urinary system     History of hematuria    Personal history of other specified conditions 07/08/2019    History of nasal congestion    Personal history of other specified conditions 09/23/2019    History of persistent cough    Personal history of other specified conditions 05/29/2014    History of urinary frequency    Personal history of transient ischemic attack (TIA), and cerebral infarction without residual deficits 09/05/2017    History of cerebral infarction     Past Surgical History:   Procedure Laterality Date    CT ANGIO NECK  9/4/2014    CT NECK ANGIO W AND WO IV CONTRAST  9/4/2014 [facility] CLINICAL LEGACY    CT HEAD ANGIO W AND WO IV CONTRAST  9/4/2014    CT HEAD ANGIO W AND WO IV CONTRAST 9/4/2014 [facility] CLINICAL LEGACY    MR HEAD ANGIO WO IV CONTRAST  9/3/2014    MR HEAD ANGIO WO IV CONTRAST 9/3/2014 [facility]CILLARY LEGACY    MR NECK ANGIO WO IV CONTRAST  9/3/2014    MR NECK ANGIO WO IV CONTRAST 9/3/2014 [facility] ANCILLARY LEGACY    OTHER SURGICAL HISTORY  04/28/2017    Treatment Of The Left Leg    OTHER SURGICAL HISTORY  04/29/2019    Hernia repair     Family History   Problem Relation Name Age of Onset    Other (end stage renal disease) Mother      Cancer Mother      Diabetes Father      Other (cardiac disorder) Other      Sickle cell trait Other       Current Outpatient Medications   Medication Sig Dispense Refill    aspirin 81 mg EC tablet Take 1 tablet (81 mg) by mouth once daily.      lisinopril 20 mg tablet TAKE ONE TABLET BY MOUTH DAILY 90 tablet 3    pravastatin (Pravachol) 80 mg tablet Take 1 tablet (80 mg) by mouth once daily. 100 tablet 3    tamsulosin (Flomax) 0.4 mg 24 hr capsule TAKE ONE CAPSULE BY MOUTH EVERY DAY 90 capsule 3     No current facility-administered medications for this visit.     No Active Allergies  Social History     Socioeconomic History    Marital status:      Spouse name: Not on file    Number of children: Not on file    Years of education: Not on file    Highest education level: Not on file   Occupational History    Not on file   Tobacco Use    Smoking status: Former     Current packs/day: 1.00     Average packs/day: 1 pack/day for 30.0 years (30.0 ttl pk-yrs)     Types: Cigarettes    Smokeless tobacco: Never   Substance and Sexual Activity    Alcohol use: Yes     Comment: beers on weekends    Drug use: Yes     Types: Marijuana    Sexual activity: Not on file   Other Topics Concern    Not on file   Social History Narrative    Not on file     Social Determinants of Health     Financial Resource Strain: Not on file   Food Insecurity: Not on file    Transportation Needs: Not on file   Physical Activity: Not on file   Stress: Not on file   Social Connections: Not on file   Intimate Partner Violence: Not on file   Housing Stability: Not on file       Review of Systems  Pertinent items are noted in HPI.    Objective       Lab Review  Lab Results   Component Value Date    WBC 3.6 (L) 01/16/2024    RBC 4.42 (L) 01/16/2024    HGB 14.0 01/16/2024    HCT 40.7 (L) 01/16/2024     01/16/2024      Lab Results   Component Value Date    BUN 17 07/18/2024    CREATININE 1.14 07/18/2024      Lab Results   Component Value Date    PSA 2.99 08/26/2024           Assessment/Plan   Diagnoses and all orders for this visit:  Benign prostatic hyperplasia with weak urinary stream  -     MR prostate with rachel boundaries if pirads 3 or above; Future    BPH with LUTS - persistent nocturia and weak urinary stream  Elevated PSA     We will obtain a prostate MRI to assess prostate anatomy and r/o prostate cancer.     Follow up virtually to review.     All questions were answered to the patient's satisfaction. Patient agrees with the plan and wishes to proceed. Follow-up will be scheduled appropriately.     E&M visit today is associated with current or anticipated ongoing medical care services related to a patient's single, serious condition or a complex condition.    Scribed for Dr. Atkins by Rita Perez. I , Dr Atkins, have personally reviewed and agreed with the information entered by the Virtual Scribe.

## 2024-10-30 ENCOUNTER — APPOINTMENT (OUTPATIENT)
Dept: UROLOGY | Facility: CLINIC | Age: 69
End: 2024-10-30
Payer: MEDICARE

## 2024-11-10 ENCOUNTER — HOSPITAL ENCOUNTER (OUTPATIENT)
Dept: RADIOLOGY | Facility: CLINIC | Age: 69
Discharge: HOME | End: 2024-11-10
Payer: MEDICARE

## 2024-11-10 ENCOUNTER — OFFICE VISIT (OUTPATIENT)
Dept: URGENT CARE | Age: 69
End: 2024-11-10
Payer: MEDICARE

## 2024-11-10 VITALS
HEART RATE: 78 BPM | DIASTOLIC BLOOD PRESSURE: 84 MMHG | TEMPERATURE: 98.8 F | BODY MASS INDEX: 25.34 KG/M2 | OXYGEN SATURATION: 97 % | HEIGHT: 71 IN | SYSTOLIC BLOOD PRESSURE: 168 MMHG | WEIGHT: 181 LBS | RESPIRATION RATE: 19 BRPM

## 2024-11-10 DIAGNOSIS — M25.511 ACUTE PAIN OF RIGHT SHOULDER: Primary | ICD-10-CM

## 2024-11-10 DIAGNOSIS — M25.511 ACUTE PAIN OF RIGHT SHOULDER: ICD-10-CM

## 2024-11-10 PROCEDURE — 73030 X-RAY EXAM OF SHOULDER: CPT | Mod: RIGHT SIDE | Performed by: RADIOLOGY

## 2024-11-10 PROCEDURE — 3008F BODY MASS INDEX DOCD: CPT | Performed by: FAMILY MEDICINE

## 2024-11-10 PROCEDURE — 1036F TOBACCO NON-USER: CPT | Performed by: FAMILY MEDICINE

## 2024-11-10 PROCEDURE — 99203 OFFICE O/P NEW LOW 30 MIN: CPT | Performed by: FAMILY MEDICINE

## 2024-11-10 PROCEDURE — 3077F SYST BP >= 140 MM HG: CPT | Performed by: FAMILY MEDICINE

## 2024-11-10 PROCEDURE — 73030 X-RAY EXAM OF SHOULDER: CPT | Mod: RT

## 2024-11-10 PROCEDURE — 1125F AMNT PAIN NOTED PAIN PRSNT: CPT | Performed by: FAMILY MEDICINE

## 2024-11-10 PROCEDURE — 1159F MED LIST DOCD IN RCRD: CPT | Performed by: FAMILY MEDICINE

## 2024-11-10 PROCEDURE — 1160F RVW MEDS BY RX/DR IN RCRD: CPT | Performed by: FAMILY MEDICINE

## 2024-11-10 PROCEDURE — 3079F DIAST BP 80-89 MM HG: CPT | Performed by: FAMILY MEDICINE

## 2024-11-10 ASSESSMENT — PAIN SCALES - GENERAL: PAINLEVEL_OUTOF10: 8

## 2024-11-10 ASSESSMENT — PATIENT HEALTH QUESTIONNAIRE - PHQ9
SUM OF ALL RESPONSES TO PHQ9 QUESTIONS 1 AND 2: 0
2. FEELING DOWN, DEPRESSED OR HOPELESS: NOT AT ALL
1. LITTLE INTEREST OR PLEASURE IN DOING THINGS: NOT AT ALL

## 2024-11-10 NOTE — PROGRESS NOTES
HPI:  Patient states that he slept on a love seat yesterday on his right side and started to have pain in his right shoulder after waking up.  Pt states that he had a hard time moving his right arm in the morning, but was able to move it more later in the day. No numbness/weakness in the RUE.  Pt is right handed.  No previous hx/o shoulder injury.  Pt was worried about dislocation.  Pt was rubbing alcohol on his shoulder.       ROS:  No CP or SOB  No numbness/weakness in RUE      PE:    A&O x3  NCAT  No conjunctival erythema  RRR  CTAB  +tndop over right anterior shoulder w/o swelling/bruising/deformity  +pain with right arm elevation above 100 degrees  Neurovascular intact over right UE  No focal deficit  Judgement normal    Results:  Xray right shoulder: no visible fx or dislocation     A/P:   Right shoulder pain    We will call you with xray results if you need further treatment.  Ice.  stretching.  Rest.  Biofreeze.  Tylenol/Motrin as needed for pain.  Follow up with orthopedist for further evaluation in 2 weeks if symptoms persist.  Keep a diary of symptoms.  Go to the ER if starts getting worse.

## 2025-01-16 ENCOUNTER — APPOINTMENT (OUTPATIENT)
Dept: HEMATOLOGY/ONCOLOGY | Facility: CLINIC | Age: 70
End: 2025-01-16
Payer: MEDICARE

## 2025-01-16 DIAGNOSIS — D72.810 LYMPHOPENIA: Primary | ICD-10-CM

## 2025-05-21 DIAGNOSIS — N40.0 BENIGN PROSTATIC HYPERPLASIA, UNSPECIFIED WHETHER LOWER URINARY TRACT SYMPTOMS PRESENT: ICD-10-CM

## 2025-05-21 DIAGNOSIS — I10 PRIMARY HYPERTENSION: ICD-10-CM

## 2025-05-21 RX ORDER — LISINOPRIL 20 MG/1
20 TABLET ORAL DAILY
Qty: 90 TABLET | Refills: 0 | OUTPATIENT
Start: 2025-05-21

## 2025-05-21 RX ORDER — TAMSULOSIN HYDROCHLORIDE 0.4 MG/1
0.4 CAPSULE ORAL DAILY
Qty: 90 CAPSULE | Refills: 0 | OUTPATIENT
Start: 2025-05-21

## 2025-06-20 ENCOUNTER — APPOINTMENT (OUTPATIENT)
Dept: PRIMARY CARE | Facility: CLINIC | Age: 70
End: 2025-06-20
Payer: MEDICARE

## 2025-06-20 VITALS — SYSTOLIC BLOOD PRESSURE: 151 MMHG | WEIGHT: 177 LBS | BODY MASS INDEX: 24.69 KG/M2 | DIASTOLIC BLOOD PRESSURE: 83 MMHG

## 2025-06-20 DIAGNOSIS — K59.00 CONSTIPATION, UNSPECIFIED CONSTIPATION TYPE: ICD-10-CM

## 2025-06-20 DIAGNOSIS — R39.12 BENIGN PROSTATIC HYPERPLASIA WITH WEAK URINARY STREAM: ICD-10-CM

## 2025-06-20 DIAGNOSIS — Z00.00 HEALTH CARE MAINTENANCE: Primary | ICD-10-CM

## 2025-06-20 DIAGNOSIS — E78.2 MIXED HYPERLIPIDEMIA: ICD-10-CM

## 2025-06-20 DIAGNOSIS — I10 PRIMARY HYPERTENSION: ICD-10-CM

## 2025-06-20 DIAGNOSIS — N40.1 BENIGN PROSTATIC HYPERPLASIA WITH WEAK URINARY STREAM: ICD-10-CM

## 2025-06-20 RX ORDER — PRAVASTATIN SODIUM 80 MG/1
80 TABLET ORAL DAILY
Qty: 100 TABLET | Refills: 1 | Status: SHIPPED | OUTPATIENT
Start: 2025-06-20 | End: 2026-07-25

## 2025-06-20 RX ORDER — LISINOPRIL 20 MG/1
20 TABLET ORAL DAILY
Qty: 90 TABLET | Refills: 1 | Status: SHIPPED | OUTPATIENT
Start: 2025-06-20

## 2025-06-20 RX ORDER — TAMSULOSIN HYDROCHLORIDE 0.4 MG/1
0.4 CAPSULE ORAL 2 TIMES DAILY
Qty: 180 CAPSULE | Refills: 1 | Status: SHIPPED | OUTPATIENT
Start: 2025-06-20

## 2025-06-20 NOTE — PROGRESS NOTES
Subjective   Patient ID: Johnnie Yuan is a 70 y.o. male who presents for No chief complaint on file..    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    Physical Exam vital signs noted alert and oriented x 3 NCAT no JVD chest clear to auscultation cor regular rate rhythm S1-S2 without murmur gallop or rub abdomen soft nontender normal active bowel sounds extremities no clubbing cyanosis or edema normal distal pulses DTR 1+    Assessment/Plan    impression hypertension hyperlipidemia BPH with decreased urinary stream constipation?  Plan noticed that since his prostate was enlarged he has had some firmer bowel movements okay to increase fiber good water consumption and fluid management increase tamsulosin to 0.4 mg p.o. twice daily see EMR refill blood pressure and cholesterol medicine see EMR check comprehensive panel bedside glucose potassium and kidney function as well as liver function check PSA advised on prostate check lipid panel advised on cholesterol profile check CBC advised on blood count good overall diet regular exercise good water consumption recheck 1 to 3 months for blood pressure after being back on that medicine TT 50 cc 26

## 2025-06-25 LAB
ALBUMIN SERPL-MCNC: 4.3 G/DL (ref 3.6–5.1)
ALP SERPL-CCNC: 65 U/L (ref 35–144)
ALT SERPL-CCNC: 16 U/L (ref 9–46)
ANION GAP SERPL CALCULATED.4IONS-SCNC: 7 MMOL/L (CALC) (ref 7–17)
AST SERPL-CCNC: 16 U/L (ref 10–35)
BILIRUB SERPL-MCNC: 0.7 MG/DL (ref 0.2–1.2)
BUN SERPL-MCNC: 12 MG/DL (ref 7–25)
CALCIUM SERPL-MCNC: 9.1 MG/DL (ref 8.6–10.3)
CHLORIDE SERPL-SCNC: 105 MMOL/L (ref 98–110)
CHOLEST SERPL-MCNC: 216 MG/DL
CHOLEST/HDLC SERPL: 4.1 (CALC)
CO2 SERPL-SCNC: 26 MMOL/L (ref 20–32)
CREAT SERPL-MCNC: 1.06 MG/DL (ref 0.7–1.28)
EGFRCR SERPLBLD CKD-EPI 2021: 75 ML/MIN/1.73M2
ERYTHROCYTE [DISTWIDTH] IN BLOOD BY AUTOMATED COUNT: 12.2 % (ref 11–15)
GLUCOSE SERPL-MCNC: 98 MG/DL (ref 65–99)
HCT VFR BLD AUTO: 43.4 % (ref 38.5–50)
HDLC SERPL-MCNC: 53 MG/DL
HGB BLD-MCNC: 14.2 G/DL (ref 13.2–17.1)
LDLC SERPL CALC-MCNC: 145 MG/DL (CALC)
MCH RBC QN AUTO: 32.3 PG (ref 27–33)
MCHC RBC AUTO-ENTMCNC: 32.7 G/DL (ref 32–36)
MCV RBC AUTO: 98.6 FL (ref 80–100)
NONHDLC SERPL-MCNC: 163 MG/DL (CALC)
PLATELET # BLD AUTO: 212 THOUSAND/UL (ref 140–400)
PMV BLD REES-ECKER: 9.5 FL (ref 7.5–12.5)
POTASSIUM SERPL-SCNC: 4.2 MMOL/L (ref 3.5–5.3)
PROT SERPL-MCNC: 6.8 G/DL (ref 6.1–8.1)
PSA SERPL-MCNC: 3.06 NG/ML
RBC # BLD AUTO: 4.4 MILLION/UL (ref 4.2–5.8)
SODIUM SERPL-SCNC: 138 MMOL/L (ref 135–146)
TRIGL SERPL-MCNC: 81 MG/DL
WBC # BLD AUTO: 3.2 THOUSAND/UL (ref 3.8–10.8)